# Patient Record
Sex: FEMALE | Race: WHITE | URBAN - METROPOLITAN AREA
[De-identification: names, ages, dates, MRNs, and addresses within clinical notes are randomized per-mention and may not be internally consistent; named-entity substitution may affect disease eponyms.]

---

## 2017-01-01 ENCOUNTER — RESULTS ONLY (OUTPATIENT)
Dept: OTHER | Facility: CLINIC | Age: 62
End: 2017-01-01

## 2017-01-01 ENCOUNTER — OFFICE VISIT (OUTPATIENT)
Dept: FAMILY MEDICINE | Facility: CLINIC | Age: 62
End: 2017-01-01
Payer: COMMERCIAL

## 2017-01-01 ENCOUNTER — ALLIED HEALTH/NURSE VISIT (OUTPATIENT)
Dept: TRANSPLANT | Facility: CLINIC | Age: 62
End: 2017-01-01
Attending: INTERNAL MEDICINE
Payer: COMMERCIAL

## 2017-01-01 ENCOUNTER — MEDICAL CORRESPONDENCE (OUTPATIENT)
Dept: TRANSPLANT | Facility: CLINIC | Age: 62
End: 2017-01-01

## 2017-01-01 VITALS
WEIGHT: 167.9 LBS | DIASTOLIC BLOOD PRESSURE: 59 MMHG | BODY MASS INDEX: 26.98 KG/M2 | SYSTOLIC BLOOD PRESSURE: 94 MMHG | HEART RATE: 89 BPM | HEIGHT: 66 IN | RESPIRATION RATE: 16 BRPM | OXYGEN SATURATION: 97 %

## 2017-01-01 VITALS
DIASTOLIC BLOOD PRESSURE: 64 MMHG | OXYGEN SATURATION: 100 % | TEMPERATURE: 97.5 F | BODY MASS INDEX: 26.19 KG/M2 | HEART RATE: 92 BPM | SYSTOLIC BLOOD PRESSURE: 104 MMHG | WEIGHT: 161 LBS

## 2017-01-01 DIAGNOSIS — Z71.9 ENCOUNTER FOR COUNSELING: Primary | ICD-10-CM

## 2017-01-01 DIAGNOSIS — Z79.4 TYPE 2 DIABETES MELLITUS WITH COMPLICATION, WITH LONG-TERM CURRENT USE OF INSULIN (H): Primary | ICD-10-CM

## 2017-01-01 DIAGNOSIS — I25.10 CORONARY ARTERY DISEASE INVOLVING NATIVE HEART WITHOUT ANGINA PECTORIS, UNSPECIFIED VESSEL OR LESION TYPE: ICD-10-CM

## 2017-01-01 DIAGNOSIS — E11.8 TYPE 2 DIABETES MELLITUS WITH COMPLICATION, WITH LONG-TERM CURRENT USE OF INSULIN (H): Primary | ICD-10-CM

## 2017-01-01 DIAGNOSIS — C92.00 ACUTE MYELOID LEUKEMIA NOT HAVING ACHIEVED REMISSION (H): ICD-10-CM

## 2017-01-01 DIAGNOSIS — C92.00 AML (ACUTE MYELOID LEUKEMIA) (H): Primary | ICD-10-CM

## 2017-01-01 DIAGNOSIS — G47.00 INSOMNIA, UNSPECIFIED TYPE: ICD-10-CM

## 2017-01-01 DIAGNOSIS — F41.1 GAD (GENERALIZED ANXIETY DISORDER): ICD-10-CM

## 2017-01-01 DIAGNOSIS — I77.9 BILATERAL CAROTID ARTERY DISEASE (H): ICD-10-CM

## 2017-01-01 DIAGNOSIS — E78.2 MIXED HYPERLIPIDEMIA: ICD-10-CM

## 2017-01-01 DIAGNOSIS — C92.00 ACUTE MYELOID LEUKEMIA NOT HAVING ACHIEVED REMISSION (H): Primary | ICD-10-CM

## 2017-01-01 LAB
ASBT COMMENTS: NORMAL
ASBTTEST METHOD: NORMAL
COPATH REPORT: NORMAL
DRSBT COMMENTS: NORMAL
DRSBTTEST METHOD: NORMAL
HBA1C MFR BLD: 9.3 % (ref 4.3–6)
HLA ABDR/DQ HIGH RESOLUTION BMT RECIPIENT: NORMAL
PRA BMT: NORMAL
SA1 CELL: NORMAL
SA1 COMMENTS: NORMAL
SA1 HI RISK ABY: NORMAL
SA1 MOD RISK ABY: NORMAL
SA1 TEST METHOD: NORMAL
SA2 CELL: NORMAL
SA2 COMMENTS: NORMAL
SA2 HI RISK ABY UA: NORMAL
SA2 MOD RISK ABY: NORMAL
SA2 TEST METHOD: NORMAL
SBTA* LOCUS NMDP: NORMAL
SBTA* LOCUS: NORMAL
SBTA*: NORMAL
SBTB* LOCUS: NORMAL
SBTB*: NORMAL
SBTC* LOCUS: NORMAL
SBTC*: NORMAL
SBTDQB1*: NORMAL
SBTDQB1*LOCUS: NORMAL
SBTDRB1* LOCUS - FCIS: NORMAL
SBTDRB1* NMDP: NORMAL
SBTDRB1*: NORMAL
SBTDRB4*LOCUS NMDP: NORMAL
SBTDRB4*LOCUS: NORMAL
SCR 1 TEST METHOD: NORMAL
SCR1 CELL: NORMAL
SCR1 COMMENTS: NORMAL
SCR1 RESULT: NORMAL
SCR2 CELL: NORMAL
SCR2 COMMENTS: NORMAL
SCR2 RESULT: NORMAL
SCR2 TEST METHOD: NORMAL

## 2017-01-01 PROCEDURE — 81378 HLA I & II TYPING HR: CPT | Performed by: INTERNAL MEDICINE

## 2017-01-01 PROCEDURE — 99203 OFFICE O/P NEW LOW 30 MIN: CPT | Performed by: PHYSICIAN ASSISTANT

## 2017-01-01 PROCEDURE — 81376 HLA II TYPING 1 LOCUS LR: CPT | Mod: 59 | Performed by: INTERNAL MEDICINE

## 2017-01-01 PROCEDURE — 86828 HLA CLASS I&II ANTIBODY QUAL: CPT | Performed by: INTERNAL MEDICINE

## 2017-01-01 PROCEDURE — 86833 HLA CLASS II HIGH DEFIN QUAL: CPT | Performed by: INTERNAL MEDICINE

## 2017-01-01 PROCEDURE — 99212 OFFICE O/P EST SF 10 MIN: CPT

## 2017-01-01 PROCEDURE — 81370 HLA I & II TYPING LR: CPT | Performed by: INTERNAL MEDICINE

## 2017-01-01 PROCEDURE — 81382 HLA II TYPING 1 LOC HR: CPT | Performed by: INTERNAL MEDICINE

## 2017-01-01 PROCEDURE — 86832 HLA CLASS I HIGH DEFIN QUAL: CPT | Performed by: INTERNAL MEDICINE

## 2017-01-01 PROCEDURE — 40000268 ZZH STATISTIC NO CHARGES: Mod: ZF

## 2017-01-01 PROCEDURE — 00000345 ZZHCL STATISTIC REV BONE MARROW OUTSIDE SLIDES TC 88321: Performed by: INTERNAL MEDICINE

## 2017-01-01 PROCEDURE — 36415 COLL VENOUS BLD VENIPUNCTURE: CPT | Performed by: PHYSICIAN ASSISTANT

## 2017-01-01 PROCEDURE — 83036 HEMOGLOBIN GLYCOSYLATED A1C: CPT | Performed by: PHYSICIAN ASSISTANT

## 2017-01-01 RX ORDER — GLIMEPIRIDE 4 MG/1
4 TABLET ORAL
COMMUNITY
Start: 2010-04-26 | End: 2017-01-01

## 2017-01-01 RX ORDER — ACYCLOVIR 200 MG/1
2 CAPSULE ORAL
COMMUNITY

## 2017-01-01 RX ORDER — METFORMIN HCL 500 MG
500 TABLET, EXTENDED RELEASE 24 HR ORAL
COMMUNITY
Start: 2017-03-23 | End: 2017-01-01

## 2017-01-01 RX ORDER — GLIMEPIRIDE 4 MG/1
4 TABLET ORAL
Qty: 90 TABLET | Refills: 0 | Status: SHIPPED | OUTPATIENT
Start: 2017-01-01 | End: 2018-01-01

## 2017-01-01 RX ORDER — ESZOPICLONE 3 MG/1
3 TABLET, FILM COATED ORAL
COMMUNITY
Start: 2017-06-19

## 2017-01-01 RX ORDER — METOPROLOL SUCCINATE 25 MG/1
12.5 TABLET, EXTENDED RELEASE ORAL
COMMUNITY
Start: 2017-01-01

## 2017-01-01 RX ORDER — INSULIN GLARGINE 300 U/ML
INJECTION, SOLUTION SUBCUTANEOUS
Qty: 6 ML | Refills: 1 | Status: SHIPPED | OUTPATIENT
Start: 2017-01-01 | End: 2018-01-01

## 2017-01-01 RX ORDER — LOSARTAN POTASSIUM 25 MG/1
12.5 TABLET ORAL
COMMUNITY
Start: 2017-01-01 | End: 2018-01-01

## 2017-01-01 RX ORDER — METFORMIN HCL 500 MG
500 TABLET, EXTENDED RELEASE 24 HR ORAL
Qty: 90 TABLET | Refills: 0 | Status: SHIPPED | OUTPATIENT
Start: 2017-01-01 | End: 2018-01-01

## 2017-01-01 RX ORDER — SIMVASTATIN 20 MG
20 TABLET ORAL AT BEDTIME
Qty: 90 TABLET | Refills: 0 | Status: SHIPPED | OUTPATIENT
Start: 2017-01-01 | End: 2018-01-01

## 2017-01-01 RX ORDER — CODEINE PHOSPHATE AND GUAIFENESIN 10; 100 MG/5ML; MG/5ML
SOLUTION ORAL
COMMUNITY
Start: 2017-01-01 | End: 2017-01-01

## 2017-01-01 RX ORDER — SIMVASTATIN 20 MG
TABLET ORAL AT BEDTIME
COMMUNITY
End: 2017-01-01

## 2017-01-01 RX ORDER — INSULIN GLARGINE 300 U/ML
INJECTION, SOLUTION SUBCUTANEOUS
Refills: 1 | COMMUNITY
Start: 2017-01-01 | End: 2017-01-01

## 2017-01-01 RX ORDER — LORAZEPAM 1 MG/1
1 TABLET ORAL
COMMUNITY
Start: 2017-01-01 | End: 2018-01-01

## 2017-01-01 RX ORDER — SENNOSIDES A AND B 8.6 MG/1
1-3 TABLET, FILM COATED ORAL
COMMUNITY
End: 2017-01-01

## 2017-01-01 RX ORDER — ACETAMINOPHEN 500 MG
500-1000 TABLET ORAL
COMMUNITY
End: 2017-01-01

## 2017-01-01 ASSESSMENT — PAIN SCALES - GENERAL: PAINLEVEL: NO PAIN (0)

## 2017-09-27 PROBLEM — C92.00 AML (ACUTE MYELOID LEUKEMIA) (H): Status: ACTIVE | Noted: 2017-01-01

## 2017-09-27 NOTE — MR AVS SNAPSHOT
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  After hours call the   hospital at [phone] or   [phone]. Ask for the BMT   fellow on-call            Follow-ups after your visit        Who to contact     If you have questions or need follow up information about today's clinic visit or your schedule please contact [facility] B[facility]OD AND MARROW TRANSPLANT directly at [phone].  Normal or non-critical lab and imaging results will be communicated to you by EyeQuanthart, letter or phone within 4 business days after the clinic has received the results. If you do not hear from us within 7 days, please contact the clinic through [employer] or phone. If you have a critical or abnormal lab result, we will notify you by phone as soon as possible.  Submit refill requests through Verical or call your pharmacy and they will forward the refill request to us. Please allow 3 business days for your refill to be completed.          Additional Information About Your Visit        EyeQuanthart Information     Verical lets you send messages to your doctor, view your test results, renew your prescriptions, schedule appointments and more. To sign up, go to [email or web address] . Click on \"Log in\" on the left side of the screen, which will take you to the Welcome page. Then click on \"Sign up Now\" on the right side of the page. " "    You will be asked to enter the access code listed below, as well as some personal information. Please follow the directions to create your username and password.     Your access code is: [email or web address]  Expires: 2017 10:17 AM     Your access code will  in 90 days. If you need help or a new code, please call your [facility] clinic or [phone].        Care EveryWhere ID     This is your Care EveryWhere ID. This could be used by other organizations to access your [facility] medical records  [id number]        Your Vitals Were     Pulse Respirations Height Pulse Oximetry BMI (Body Mass Index)       89 16 1.67 m (5' 5.75\") 97% 27.31 kg/m2        Blood Pressure from Last 3 Encounters:   17 94/59    Weight from Last 3 Encounters:   17 76.2 kg (167 lb 14.4 oz)              We Performed the Following     HLA ABDR/DQ High Res BMT Recipient     PRA BMT        Recent Review Flowsheet Data     There is no flowsheet data to display.               Primary Care Provider    None Specified       No primary provider on file.        Equal Access to Services     [facility]: Hadii tabitha Mason, waolinda diego, qamilind goodson, bob darby . So [facility] [phone].    ATENCIÓN: Si habla español, tiene a duncan disposición servicios gratuitos de asistencia lingüística. Llame al [phone].    We comply with applicable federal civil rights laws and [place] laws. We do not discriminate on the basis of race, color, national origin, age, disability, sex, sexual orientation, or gender identity.            Thank you!     Thank you for choosing [facility] BLOOD AND MARROW TRANSPLANT  for your care. Our goal is always to provide you with excellent care. Hearing back from our patients is one way we can continue to improve our services. Please take a few minutes to complete the written survey that you may receive in the mail after your visit with us. Thank you!           "   Your Updated Medication List - Protect others around you: Learn how to safely use, store and throw away your medicines at www.disposemymeds.org.          This list is accurate as of: 9/27/17 11:59 PM.  Always use your most recent med list.                   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

## 2017-09-27 NOTE — PROGRESS NOTES
September 27, 2017       Kal Golden MD    [address] [place] [address]      RE:  Darleen Mccraytz      Dear Dr. Golden:       Today I saw and examined in consultation Ms. Darleen Ayala, a 62-year-old female who you referred to discuss transplantation as part of the treatment options for her acute myelogenous leukemia.  You are well aware of the patient's medical history, but I will summarize it for our medical records.      The patient presented in mid July with progressively more fatigue and lack of energy.  She went to the doctor, who did not find anything specific, but then she returned to the Emergency Department and was referred to Hematology.  Further evaluation demonstrated that she had an elevated white cell count at 15,000.  I do not have the original CBC to determine if there were circulating blasts.  She was also thrombocytopenic and anemic.  She underwent a bone marrow biopsy on 07/26/2017 that demonstrated hypercellular marrow with 70% blasts.  The blasts had t(8;21).  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  Molecular markers for FLT3 were negative, NPMN1 negative, and CEBPA negative.  Phenotype of the disease showed it to be CD34 positive; myeloperoxidase positive; CD28, CD38, , CD13, HLA-DR, , , CD45, CD33, CD56, CD19, CD15 and CD79b positive.  The patient was admitted for induction chemotherapy and received 7+3.  Her day 14 marrow was hypocellular with 10-20% overall cellularity and 1% blasts.  FISH analysis showed that 64% of the cells were still positive for the t(8;21).  The patient had no further chemotherapy, as she was dealing with complications of the induction regimen.  The next bone marrow biopsy obtained upon count recovery showed a 30% cellular marrow which is slightly hypercellular for age with trilineage hematopoiesis but slightly decreased.  It was estimated to have 2% blasts and FISH showed only 3% of the  cells being positive for t(8;21).  Molecular testing for the    c-KIT mutation was still pending at the time of this bone marrow from 08/21/2017.  As part of her induction chemotherapy, the patient had fluid overload and was found to have new onset cardiomyopathy.  She was on oxygen support and an echo demonstrated diffuse global hypokinesis with an ejection fraction of 50-55%.  She had fevers starting on August 5 that persisted for several days.  She was managed with broad-spectrum antibiotics including tobramycin and vancomycin.  CT of the chest showed diffuse, well-defined, reticular and ground-glass infiltrates involving all lobes.  She had some reticular cystic fibrosis that could represent some scarring that had been present since 2012.  She had some patchy cavitary areas that could represent sequelae from septic emboli.  The patient was started on posaconazole and fevers eventually subsided.       Cardiology was consulted for the cardiomyopathy with an elevated troponin.  She was started on an ACE inhibitor and then on aspirin when platelet counts recovered.  She was also put on a beta blocker.       In addition, the patient developed dysuria and vaginitis with erythematous desquamative lesions on August 17 that were eventually found to be consistent with herpes simplex and she was treated with acyclovir.  On August 18 her counts recovered with a platelet count over 100,000 and the patient was eventually discharged.  On September 12 she received her first cycle of consolidation high-dose cytarabine given on days 1, 3 and 5.  She tolerated it well and she is currently being monitored outpatient.       The review of systems shows that the patient has had type 2 diabetes for the last 20 years.  She had been on metformin and she is now also on insulin.  She has coronary artery disease with a stent placed in 2007.  At that time, she was told she was having a myocardial infarction.  There is the cardiomyopathy that  was recently diagnosed during her hospitalization.  She was also told that she had hepatitis about a year ago.  Additional significant findings are described above in the history of present illness.  Otherwise, the remaining complete review of systems is negative.       Her past medical history is significant for:    1.  Seizures x1 five years ago.   2.  Coronary artery disease with a stent placed in .  The patient presented with a clinical picture consistent with evolving MI.   3.  The patient had a finger surgery for an injury.   4.  Cataract surgery in both eyes in 2017.   5.  Diabetes for 20 years, on insulin and metformin.     6.  Genital HSV.    7.  Stress-related eating disorder.   8.  Hepatitis.       The patient smoked since the age of 14 and is still trying to quit.  She does not abuse alcohol but used to drink until she was told about the hepatitis.  She denies the use or abuse of drugs.       She is known to be allergic to Dilaudid which caused cough, itching and trouble breathing.  Oxycodone also caused some itching.  Levaquin caused anaphylaxis according to the patient and her records.  She developed significant local swelling and erythema with a tetanus shot in the past and is felt to be allergic to that as well.      Her family history is significant for a half sister who is still alive at age 80 and has scleroderma.  She has one full brother who  at age 17 from an accident.  She has a living, healthy, 40-year-old daughter.  There are no other children or living relatives.       Her social history shows that she lives in [place].  She has a boyfriend.  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      On physical exam today, Ms. Ayala was alert, oriented, and in no acute distress.  She is a very pleasant and talkative 62-year-old.  Her apparent age is slightly older than her stated age.  She has complete alopecia.  She has a Groshong catheter in  the right upper chest that is clean with no evidence of infection.  Her buccal mucosa is intact with no drainage from the nasopharynx.  She is missing several teeth and her other teeth are not in great condition, but there are no broken teeth or signs of gum disease.  Her lungs have crackles in both bases that clear partially when she coughs.  There was no wheezing or rhonchi.  Breathing sounds are slightly decreased symmetrically in the bases.  The heart had regular rhythm and rate with no gallop, rub or murmur.  Neck veins are not elevated.  Her abdomen was flat and soft with no organomegaly or masses.  Her liver seemed to be about 10 cm on percussion.  Extremities were warm and well-perfused with trace edema.  The patient did not have any rashes, bruises or petechiae that I found.  The patient was grossly neurologically intact.       Assessment and Recommendation:  Ms. 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  She is here to discuss transplantation as part of the treatment options for her disease.  She is accompanied by her daughter.      I first reviewed with the patient the risk stratification of acute leukemia that we used to determine whether the patient is a candidate for transplant in first remission.  While the patient has the 6(8;21), she also has a c-KIT mutation that increases the risk of relapse of core binding factor AML.  In addition, the patient has multiple serious comorbidities that increase her risk of transplant-related mortality.  I explained to the patient that transplant is one of the treatment alternatives for consolidation in her case, but we have to be very thoughtful because of the obvious increased risk of transplant-related complications.       We then talked about how we get a patient to transplant starting with the determination of donor availability.  The patient has a biological daughter who will be  a half match to her and I am certain that we will be able to find at least unrelated cord blood available for her in the registry.  I discussed the pros and cons of the different donors and the fact that there is no matched sibling available for this patient.  I also mentioned to her the randomized trial comparing umbilical cord blood to haploidentical transplants for which she would be a good candidate.       We then talked about the process of pre-transplant evaluation where we determine disease stage as well as organ function.  I told the patient that her current comorbidities increase her risk of treatment-related mortality.  Further objective determination of those results will be necessary to better estimate that risk.        DICTATION ENDED HERE       CONTINUATION OF DICTATION       I then explained to the patient the rationale behind a donor transplant.  I explained to her that the idea is to replace not only her bone marrow, but her immune system, and have the immune system of the donor control the disease.  I then described to her the reaction of the donor versus the normal tissues of the recipient, which is called renqu-jirgwf-liia disease (GVHD).  I explained that over the long term we want the immune system of the patient to control the leukemia and keep it from coming back.       We then talked about the pre-transplant evaluation in which we check her heart, lungs, liver, kidneys and disease function.  With those results in place, we can determine whether the patient meets minimal eligibility criteria in terms of organ function.  I did emphasize to the patient that those who meet those criteria in several different areas by just a little bit are still at a very increased risk of treatment-related complications and mortality.        We then talked about the most common and serious complications of transplant including, but not limited to, infections, zzgwe-pmtgiq-vasv disease, disease relapse and organ  damage.  We talked about the most common and frequently used supportive measures during transplant which include chemotherapy, immunosuppression, blood products, IV nutrition and growth factors.       Overall, I quoted to the patient about a third of a chance of long-term disease-free survival with a third of a chance of treatment-related mortality and a third possibility of disease relapse and death.  Because of her comorbidity score that is at least a score of 3, maybe 4, this patient has a 40% risk of mortality in the 2 years post-transplant.  Most of that mortality occurs in the first 6 months, though.       The patient repeated back tome the zaman points of this visit including the expected long-term outcomes, the key elements of her pre-transplant evaluation, and the fact that she may be a candidate for the haplo versus cord study.  I explained to her that there is the alternative that she could continue with consolidation chemotherapy which would have less up-front risk but could result in relapsed disease in the long term.  Those that relapse will need additional salvage therapy to then be considered for transplant.  In that case, however, it is unequivocal that transplant has better outcomes than chemotherapy alone.  However, this patient's comorbidities are unlikely to get any better.  If anything, they are likely to get worse, and she may be even a worse candidate for transplant if she achieves a second remission.       The patient showed good understanding of these discussions.      At this point, my recommendation is:     1.  We should monitor her recovery from her first cycle of consolidation and plan for a second cycle.    2.  We will obtain HLA typing from the patient and her daughter and obtain consents and start a search for alternative donors in the registries as well as her HLA antibodies.    3.  It would be very helpful if we could get a hepatitis serology with HBsAg, anti-HBc, anti-HBs and  hepatitic C to determine if this patient has active chronic hepatitis or not based on her history.    4.  I fully support the patient going back to Cardiology and optimizing her heart function as much as possible.    5.  I encouraged the patient to stop smoking, and she says she will continue to work on that.    6.  It may be worth having the patient see a pulmonologist to better evaluate those lung nodules, especially the cavitary ones, as soon as possible.  A biopsy may be required.     7.  I would keep the patient on a broad-spectrum antifungal such posaconazole or voriconazole for the time being with the caution of stopping it during the administration of chemotherapy.       Thank you very much for referring Ms. Ayala and allowing us to participate in her care.  I will further discuss her case with my colleagues here at [facility] in our next patient care conference and provide you with more definitive recommendations in the next few days.      cc:  Anya Soler MD     [facility] [place] [address]       Addendum: case discussed in BMT patient care conference. There was concern with co-morbidities and risk/benefit ration. 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 It was furhter recommended to obtain BMBX upon recovery of recent HIAC consolidation and PFTs with corrected DLCO. We will then rediscuss case and determine if transplant should ever be considered. I reviewed the above with Dr Golden today 10/03/17.

## 2017-09-27 NOTE — NURSING NOTE
"Oncology Rooming Note    September 27, 2017 9:25 AM   Darleen Ayala is a 62 year old female who presents for:    No chief complaint on file.    Initial Vitals: BP 94/59 (BP Location: Right arm, Patient Position: Right side, Cuff Size: Adult Regular)  Pulse 89  Resp 16  Ht 1.67 m (5' 5.75\")  Wt 76.2 kg (167 lb 14.4 oz)  SpO2 97%  BMI 27.31 kg/m2 Estimated body mass index is 27.31 kg/(m^2) as calculated from the following:    Height as of this encounter: 1.67 m (5' 5.75\").    Weight as of this encounter: 76.2 kg (167 lb 14.4 oz). Body surface area is 1.88 meters squared.  No Pain (0) Comment: Data Unavailable   No LMP recorded.  Allergies reviewed: Yes  Medications reviewed: Yes    Medications: Medication refills not needed today.  Pharmacy name entered into EPIC: Data Unavailable    Clinical concerns: none-pt is here as as new eval     6 minutes for nursing intake (face to face time)     Purvi Mejias MA                "

## 2017-09-27 NOTE — PROGRESS NOTES
Met with patient and daughter, Tracy, after new evaluation with Dr Ruff regarding plan and BMT nurse coordinator role. Provided patient with Rudy Resendiz's and Shiela Monsivais' contact information for future questions and plan. HLA/PRA drawn, URD and financial consents signed. Daughter's HLA drawn as well. Patient verbalized understanding of provided information.

## 2017-09-27 NOTE — MR AVS SNAPSHOT
"              After Visit Summary   9/27/2017    Darleen Ayala    MRN: [id number]           Patient Information     Date Of Birth          1955        Visit Information        Provider Department      9/27/2017 11:00 AM Marcia Coats MSW;  2 116 CONSULT [facility] Blood and Marrow Transplant        Today's Diagnoses     Encounter for counseling    -  1          [facility] ([facility]  [address] [place] [id number]  Phone: [phone]  Clinic Hours:   Monday-Thursday:7am to 7pm   Friday: 7am to 5pm   Weekends and holidays:    8am to noon (in general)  If your fever is 100.5  or greater,   call the clinic.  After hours call the   hospital at [phone] or   [phone]. Ask for the BMT   fellow on-call            Follow-ups after your visit        Who to contact     If you have questions or need follow up information about today's clinic visit or your schedule please contact [facility] BLOOD AND MARROW TRANSPLANT directly at [phone].  Normal or non-critical lab and imaging results will be communicated to you by [employer], letter or phone within 4 business days after the clinic has received the results. If you do not hear from us within 7 days, please contact the clinic through JUN[employer] or phone. If you have a critical or abnormal lab result, we will notify you by phone as soon as possible.  Submit refill requests through "[employer] or call your pharmacy and they will forward the refill request to us. Please allow 3 business days for your refill to be completed.          Additional Information About Your Visit        [employer] Information     "[employer] lets you send messages to your doctor, view your test results, renew your prescriptions, schedule appointments and more. To sign up, go to [email or web address] . Click on \"Log in\" on the left side of the screen, which will take you to the Welcome page. Then click on \"Sign up Now\" on the right side of the page.     You will be " asked to enter the access code listed below, as well as some personal information. Please follow the directions to create your username and password.     Your access code is: [email or web address]  Expires: 2017 10:17 AM     Your access code will  in 90 days. If you need help or a new code, please call your [place] clinic or [phone].        Care EveryWhere ID     This is your Care EveryWhere ID. This could be used by other organizations to access your [place] medical records  [id number]         Blood Pressure from Last 3 Encounters:   17 94/59    Weight from Last 3 Encounters:   17 76.2 kg (167 lb 14.4 oz)              Today, you had the following     No orders found for display       Recent Review Flowsheet Data     There is no flowsheet data to display.               Primary Care Provider    None Specified       No primary provider on file.        Equal Access to Services     [facility]: Hadgudelia Mason, harrison diego, melisa goodson, bob darby . So [facility] [phone].    ATENCIÓN: Si habla español, tiene a duncan disposición servicios gratuitos de asistencia lingüística. Llame al [phone].    We comply with applicable federal civil rights laws and [place] laws. We do not discriminate on the basis of race, color, national origin, age, disability, sex, sexual orientation, or gender identity.            Thank you!     Thank you for choosing [facility] BLOOD AND MARROW TRANSPLANT  for your care. Our goal is always to provide you with excellent care. Hearing back from our patients is one way we can continue to improve our services. Please take a few minutes to complete the written survey that you may receive in the mail after your visit with us. Thank you!             Your Updated Medication List - Protect others around you: Learn how to safely use, store and throw away your medicines at www.disposemymeds.org.          This list is  accurate as of: 9/27/17 11:59 PM.  Always use your most recent med list.                   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

## 2017-09-27 NOTE — MR AVS SNAPSHOT
"              After Visit Summary   9/27/2017    Darleen Ayala    MRN: [id number]           Patient Information     Date Of Birth          1955        Visit Information        Provider Department      9/27/2017 10:30 AM Coordinator,  Bmt Nurse;  2 116 CONSULT [facility] Blood and Marrow Transplant        Today's Diagnoses     AML (acute myeloid leukemia) (H)    -  1          [facility] [place] [facility] [place]  [address] [place] [address]  Phone: [phone]  Clinic Hours:   Monday-Thursday:7am to 7pm   Friday: 7am to 5pm   Weekends and holidays:    8am to noon (in general)  If your fever is 100.5  or greater,   call the clinic.  After hours call the   hospital at [phone] or   [phone]. Ask for the BMT   fellow on-call            Follow-ups after your visit        Who to contact     If you have questions or need follow up information about today's clinic visit or your schedule please contact [facility] BLOOD [facility] MARROW TRANSPL[facility] directly at [phone].  Normal or non-critical lab and imaging results will be communicated to you by [employer], letter or phone within 4 business days after the clinic has received the results. If you do not hear from us within 7 days, please contact the clinic through [employer]Landingt or phone. If you have a critical or abnormal lab result, we will notify you by phone as soon as possible.  Submit refill requests through [employer] or call your pharmacy and they will forward the refill request to us. Please allow 3 business days for your refill to be completed.          Additional Information About Your Visit        [employer] Information     [employer] lets you send messages to your doctor, view your test results, renew your prescriptions, schedule appointments and more. To sign up, go to [email or web address] . Click on \"Log in\" on the left side of the screen, which will take you to the Welcome page. Then click on \"Sign up Now\" on the right side of the page. "     You will be asked to enter the access code listed below, as well as some personal information. Please follow the directions to create your username and password.     Your access code is: [email or web address]  Expires: 2017 10:17 AM     Your access code will  in 90 days. If you need help or a new code, please call your [place] clinic or [phone].        Care EveryWhere ID     This is your Care EveryWhere ID. This could be used by other organizations to access your [place] medical records  [id number]         Blood Pressure from Last 3 Encounters:   17 94/59    Weight from Last 3 Encounters:   17 76.2 kg (167 lb 14.4 oz)              Today, you had the following     No orders found for display       Recent Review Flowsheet Data     There is no flowsheet data to display.               Primary Care Provider    None Specified       No primary provider on file.        Equal Access to Services     [facility]: Hadii tabitha Mason, waolinda diego, melisa steelealmada hamzah, bob darby . So [facility] [phone].    ATENCIÓN: Si habla español, tiene a duncan disposición servicios gratuitos de asistencia lingüística. Llame al [phone].    We comply with applicable federal civil rights laws and [place] laws. We do not discriminate on the basis of race, color, national origin, age, disability sex, sexual orientation or gender identity.            Thank you!     Thank you for choosing [facility] BLOOD AND MARROW TRANSPLANT  for your care. Our goal is always to provide you with excellent care. Hearing back from our patients is one way we can continue to improve our services. Please take a few minutes to complete the written survey that you may receive in the mail after your visit with us. Thank you!             Your Updated Medication List - Protect others around you: Learn how to safely use, store and throw away your medicines at www.disposemymeds.org.           This list is accurate as of: 9/27/17 11:03 AM.  Always use your most recent med list.                   Brand Name Dispense Instructions for use Diagnosis    acetaminophen 500 MG tablet    TYLENOL     Take 500-1,000 mg by mouth    AML (acute myeloid leukemia) (H)       eszopiclone 3 MG tablet    LUNESTA     Take 3 mg by mouth    AML (acute myeloid leukemia) (H)       glimepiride 4 MG tablet    AMARYL     Take 4 mg by mouth    AML (acute myeloid leukemia) (H)       losartan 25 MG tablet    COZAAR     Take 12.5 mg by mouth    AML (acute myeloid leukemia) (H)       metFORMIN 500 MG 24 hr tablet    GLUCOPHAGE-XR     Take 500 mg by mouth    AML (acute myeloid leukemia) (H)       metoprolol 25 MG 24 hr tablet    TOPROL-XL     Take 12.5 mg by mouth    AML (acute myeloid leukemia) (H)       senna 8.6 MG tablet    SENOKOT     Take 1-3 tablets by mouth    AML (acute myeloid leukemia) (H)       TOUJEO SOLOSTAR 300 UNIT/ML injection   Generic drug:  insulin glargine U-300      INJECT 18 UNITS UNDER THE SKIN HS.    AML (acute myeloid leukemia) (H)       VIRTUSSIN A/C 100-10 MG/5ML Soln solution   Generic drug:  guaiFENesin-codeine      TAKE 5-10 ML BY MOUTH EVERY 4 HOURS AS NEEDED    AML (acute myeloid leukemia) (H)

## 2017-09-27 NOTE — LETTER
9/27/2017      RE: Darleen Ayala  [address] [facility]  [place] [address]        September 27, 2017       Kal Golden MD    [address] [place] [id number]      RE:  Darleen Ayala      Dear Dr. Golden:       Today I saw and examined in consultation Ms. Darleen Ayala, a 62-year-old female who you referred to discuss transplantation as part of the treatment options for her acute myelogenous leukemia.  You are well aware of the patient's medical history, but I will summarize it for our medical records.      The patient presented in mid July with progressively more fatigue and lack of energy.  She went to the doctor, who did not find anything specific, but then she returned to the Emergency Department and was referred to Hematology.  Further evaluation demonstrated that she had an elevated white cell count at 15,000.  I do not have the original CBC to determine if there were circulating blasts.  She was also thrombocytopenic and anemic.  She underwent a bone marrow biopsy on 07/26/2017 that demonstrated hypercellular marrow with 70% blasts.  The blasts had t(8;21).  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  Molecular markers for FLT3 were negative, NPMN1 negative, and CEBPA negative.  Phenotype of the disease showed it to be CD34 positive; myeloperoxidase positive; CD28, CD38, , CD13, HLA-DR, , , CD45, CD33, CD56, CD19, CD15 and CD79b positive.  The patient was admitted for induction chemotherapy and received 7+3.  Her day 14 marrow was hypocellular with 10-20% overall cellularity and 1% blasts.  FISH analysis showed that 64% of the cells were still positive for the t(8;21).  The patient had no further chemotherapy, as she was dealing with complications of the induction regimen.  The next bone marrow biopsy obtained upon count recovery showed a 30% cellular marrow which is slightly hypercellular for age with trilineage hematopoiesis  but slightly decreased.  It was estimated to have 2% blasts and FISH showed only 3% of the cells being positive for t(8;21).  Molecular testing for the    c-KIT mutation was still pending at the time of this bone marrow from 08/21/2017.  As part of her induction chemotherapy, the patient had fluid overload and was found to have new onset cardiomyopathy.  She was on oxygen support and an echo demonstrated diffuse global hypokinesis with an ejection fraction of 50-55%.  She had fevers starting on August 5 that persisted for several days.  She was managed with broad-spectrum antibiotics including tobramycin and vancomycin.  CT of the chest showed diffuse, well-defined, reticular and ground-glass infiltrates involving all lobes.  She had some reticular cystic fibrosis that could represent some scarring that had been present since 2012.  She had some patchy cavitary areas that could represent sequelae from septic emboli.  The patient was started on posaconazole and fevers eventually subsided.       Cardiology was consulted for the cardiomyopathy with an elevated troponin.  She was started on an ACE inhibitor and then on aspirin when platelet counts recovered.  She was also put on a beta blocker.       In addition, the patient developed dysuria and vaginitis with erythematous desquamative lesions on August 17 that were eventually found to be consistent with herpes simplex and she was treated with acyclovir.  On August 18 her counts recovered with a platelet count over 100,000 and the patient was eventually discharged.  On September 12 she received her first cycle of consolidation high-dose cytarabine given on days 1, 3 and 5.  She tolerated it well and she is currently being monitored outpatient.       The review of systems shows that the patient has had type 2 diabetes for the last 20 years.  She had been on metformin and she is now also on insulin.  She has coronary artery disease with a stent placed in 2007.  At that  time, she was told she was having a myocardial infarction.  There is the cardiomyopathy that was recently diagnosed during her hospitalization.  She was also told that she had hepatitis about a year ago.  Additional significant findings are described above in the history of present illness.  Otherwise, the remaining complete review of systems is negative.       Her past medical history is significant for:    1.  Seizures x1 five years ago.   2.  Coronary artery disease with a stent placed in .  The patient presented with a clinical picture consistent with evolving MI.   3.  The patient had a finger surgery for an injury.   4.  Cataract surgery in both eyes in 2017.   5.  Diabetes for 20 years, on insulin and metformin.     6.  Genital HSV.    7.  Stress-related eating disorder.   8.  Hepatitis.       The patient smoked since the age of 14 and is still trying to quit.  She does not abuse alcohol but used to drink until she was told about the hepatitis.  She denies the use or abuse of drugs.       She is known to be allergic to Dilaudid which caused cough, itching and trouble breathing.  Oxycodone also caused some itching.  Levaquin caused anaphylaxis according to the patient and her records.  She developed significant local swelling and erythema with a tetanus shot in the past and is felt to be allergic to that as well.      Her family history is significant for a half sister who is still alive at age 80 and has scleroderma.  She has one full brother who  at age 17 from an accident.  She has a living, healthy, 40-year-old daughter.  There are no other children or living relatives.       Her social history shows that she lives in [place].  She has a boyfriend.  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      On physical exam today, Ms. Ayala was alert, oriented, and in no acute distress.  She is a very pleasant and talkative 62-year-old.  Her apparent age is  slightly older than her stated age.  She has complete alopecia.  She has a Groshong catheter in the right upper chest that is clean with no evidence of infection.  Her buccal mucosa is intact with no drainage from the nasopharynx.  She is missing several teeth and her other teeth are not in great condition, but there are no broken teeth or signs of gum disease.  Her lungs have crackles in both bases that clear partially when she coughs.  There was no wheezing or rhonchi.  Breathing sounds are slightly decreased symmetrically in the bases.  The heart had regular rhythm and rate with no gallop, rub or murmur.  Neck veins are not elevated.  Her abdomen was flat and soft with no organomegaly or masses.  Her liver seemed to be about 10 cm on percussion.  Extremities were warm and well-perfused with trace edema.  The patient did not have any rashes, bruises or petechiae that I found.  The patient was grossly neurologically intact.       Assessment and Recommendation:  Ms. 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  She is here to discuss transplantation as part of the treatment options for her disease.  She is accompanied by her daughter.      I first reviewed with the patient the risk stratification of acute leukemia that we used to determine whether the patient is a candidate for transplant in first remission.  While the patient has the 6(8;21), she also has a c-KIT mutation that increases the risk of relapse of core binding factor AML.  In addition, the patient has multiple serious comorbidities that increase her risk of transplant-related mortality.  I explained to the patient that transplant is one of the treatment alternatives for consolidation in her case, but we have to be very thoughtful because of the obvious increased risk of transplant-related complications.       We then talked about how we get a patient to transplant starting  with the determination of donor availability.  The patient has a biological daughter who will be a half match to her and I am certain that we will be able to find at least unrelated cord blood available for her in the registry.  I discussed the pros and cons of the different donors and the fact that there is no matched sibling available for this patient.  I also mentioned to her the randomized trial comparing umbilical cord blood to haploidentical transplants for which she would be a good candidate.       We then talked about the process of pre-transplant evaluation where we determine disease stage as well as organ function.  I told the patient that her current comorbidities increase her risk of treatment-related mortality.  Further objective determination of those results will be necessary to better estimate that risk.        DICTATION ENDED HERE       CONTINUATION OF DICTATION       I then explained to the patient the rationale behind a donor transplant.  I explained to her that the idea is to replace not only her bone marrow, but her immune system, and have the immune system of the donor control the disease.  I then described to her the reaction of the donor versus the normal tissues of the recipient, which is called kzese-kxskib-dviq disease (GVHD).  I explained that over the long term we want the immune system of the patient to control the leukemia and keep it from coming back.       We then talked about the pre-transplant evaluation in which we check her heart, lungs, liver, kidneys and disease function.  With those results in place, we can determine whether the patient meets minimal eligibility criteria in terms of organ function.  I did emphasize to the patient that those who meet those criteria in several different areas by just a little bit are still at a very increased risk of treatment-related complications and mortality.        We then talked about the most common and serious complications of transplant  including, but not limited to, infections, bpjcu-hrfpif-uzjs disease, disease relapse and organ damage.  We talked about the most common and frequently used supportive measures during transplant which include chemotherapy, immunosuppression, blood products, IV nutrition and growth factors.       Overall, I quoted to the patient about a third of a chance of long-term disease-free survival with a third of a chance of treatment-related mortality and a third possibility of disease relapse and death.  Because of her comorbidity score that is at least a score of 3, maybe 4, this patient has a 40% risk of mortality in the 2 years post-transplant.  Most of that mortality occurs in the first 6 months, though.       The patient repeated back tome the zaman points of this visit including the expected long-term outcomes, the key elements of her pre-transplant evaluation, and the fact that she may be a candidate for the haplo versus cord study.  I explained to her that there is the alternative that she could continue with consolidation chemotherapy which would have less up-front risk but could result in relapsed disease in the long term.  Those that relapse will need additional salvage therapy to then be considered for transplant.  In that case, however, it is unequivocal that transplant has better outcomes than chemotherapy alone.  However, this patient's comorbidities are unlikely to get any better.  If anything, they are likely to get worse, and she may be even a worse candidate for transplant if she achieves a second remission.       The patient showed good understanding of these discussions.      At this point, my recommendation is:     1.  We should monitor her recovery from her first cycle of consolidation and plan for a second cycle.    2.  We will obtain HLA typing from the patient and her daughter and obtain consents and start a search for alternative donors in the registries as well as her HLA antibodies.    3.  It  would be very helpful if we could get a hepatitis serology with HBsAg, anti-HBc, anti-HBs and hepatitic C to determine if this patient has active chronic hepatitis or not based on her history.    4.  I fully support the patient going back to Cardiology and optimizing her heart function as much as possible.    5.  I encouraged the patient to stop smoking, and she says she will continue to work on that.    6.  It may be worth having the patient see a pulmonologist to better evaluate those lung nodules, especially the cavitary ones, as soon as possible.  A biopsy may be required.     7.  I would keep the patient on a broad-spectrum antifungal such posaconazole or voriconazole for the time being with the caution of stopping it during the administration of chemotherapy.       Thank you very much for referring Ms. Ayala and allowing us to participate in her care.  I will further discuss her case with my colleagues here at [facility] in our next patient care conference and provide you with more definitive recommendations in the next few days.      cc:  Anya Soler MD     [address] [place] [address]       Addendum: case discussed in BMT patient care conference. There was concern with co-morbidities and risk/benefit ration. 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 It was furhter recommended to obtain BMBX upon recovery of recent HIAC consolidation and PFTs with corrected DLCO. We will then rediscuss case and determine if transplant should ever be considered. I reviewed the above with Dr Golden today 10/03/17.    Malvin Ruff MD

## 2017-10-03 NOTE — PROGRESS NOTES
Blood and Marrow Transplant   New Transplant Visit with   Clinical     Assessment completed on 2017 of living situation, support system, financial status, functional status, coping, stressors, need for resources and social work intervention provided as needed. Information for this assessment was provided by pt and pt's daughter-Yandy's report in addition to medical chart review and consultation with medical team.     Present:  Patient: Darleen Ayala  Daughter: Yandy Ayala  : JOELLEN Cagle, [facility]    Medical Team   Nurse Coordinator: Ligia García RN  BMT Physician: Malvin Ruff MD  Referring Physician: Shellie Soler MD    Presenting Information:  Pt is a 62 year old female diagnosed with Acute Myeloid Leukemia (AML). Pt presents for allogeneic stem cell transplant discussion.    Contact Information:  Cell Phone: [phone]    Special Needs: No needs identified at this time. Pt lives in [place] ([place] minutes[place]22[place]6 miles) which is within the required distance of the hospital. Pt does not need to relocate.     Family Information:   Spouse:    Parents:   Siblings: Sister (80 years old-Lives in [place]; half sister)  Children: 1 Daughter-Yandy Ayala    Education/Employment:  Currently employed: No; pt is on a leave. Pt said she can go back at anytime.   Employer: [employer]   Occupation:     Finances/Insurance: No insurance concerns identified at this time.    Source of Income: Social Security     KIKE discussed joselyn options and asked pt to let SW know if they would like to apply in the future. Pt received the [employer] joselyn.    KIKE provided information regarding the insurance authorization process and the role of the BMT financial case-mangers. KIKE provided contact info for the BMT financial case-managers and referred pt to them for future insurance questions.     Caregiver:   KIKE discussed with the pt and pt's  daughter-Yandy the caregiver role and expectation at length. Pt is agreeable to having a full time caregiver for the minimum of 100 days until cleared by the BMT Physician. Pt's identified caregivers are pt's daughter at this time. Pt will talk with family/friends to discuss caregiver options. Caregiver education and information provided. No caregiver concerns identified.     Healthcare Directive: No. SW provided education and forms. SW encouraged pt to have discussions with their family regarding their health care wishes.     Resources Provided:  BMT Information Book  BMT Resources Packet  Healthcare Directive  Transplant Unit Description and Information     Identified Concerns:  No concerns identified at this time.     Summary:  Pt presents to [facility] regarding an allogeneic stem cell transplant. Pt and pt's daughter asked good/appropriate questions regarding psychosocial factors related to BMT; all questions were addressed. Pt presented as calm and pleasant. Pt's affect was appropriate. Pt does not need to relocate. No concerns regarding caregiver requirement at this time.     Plan:   SW provided contact information and encouraged pt to contact SW with any additional questions, concerns, resources and/or for support. SW will continue to follow pt to provide support and guidance with resources as needed.     JOELLEN Cagle, MAR[facility]CTORIA  Phone: [phone]  Pager: [phone]

## 2017-11-16 NOTE — MR AVS SNAPSHOT
"              After Visit Summary   2017    Darleen Ayala    MRN: [id number]           Patient Information     Date Of Birth          1955        Visit Information        Provider Department      2017 11:20 AM Kehr, Kristen M, PA-C [facility]        Today's Diagnoses     Type 2 diabetes mellitus with complication, with long-term current use of insulin (H)    -  1    Acute myeloid leukemia not having achieved remission (H)           Follow-ups after your visit        Who to contact     If you have questions or need follow up information about today's clinic visit or your schedule please contact [facility] [place] directly at [phone].  Normal or non-critical lab and imaging results will be communicated to you by MyChart, letter or phone within 4 business days after the clinic has received the results. If you do not hear from us within 7 days, please contact the clinic through MyChart or phone. If you have a critical or abnormal lab result, we will notify you by phone as soon as possible.  Submit refill requests through [email or web address] or call your pharmacy and they will forward the refill request to us. Please allow 3 business days for your refill to be completed.          Additional Information About Your Visit        MyChart Information     [email or web address] lets you send messages to your doctor, view your test results, renew your prescriptions, schedule appointments and more. To sign up, go to [email or web address] . Click on \"Log in\" on the left side of the screen, which will take you to the Welcome page. Then click on \"Sign up Now\" on the right side of the page.     You will be asked to enter the access code listed below, as well as some personal information. Please follow the directions to create your username and password.     Your access code is: [email or web address]  Expires: 2017  9:17 AM     Your access code will  in 90 days. If you need help or a new code, please call your " [facility] or [phone].        Care EveryWhere ID     This is your Care EveryWhere ID. This could be used by other organizations to access your [facility] medical records  [id number]         Blood Pressure from Last 3 Encounters:   09/27/17 94/59    Weight from Last 3 Encounters:   09/27/17 167 lb 14.4 oz (76.2 kg)              We Performed the Following     Hemoglobin A1c          Today's Medication Changes          These changes are accurate as of: 11/16/17 12:12 PM.  If you have any questions, ask your nurse or doctor.               These medicines have changed or have updated prescriptions.        Dose/Directions    glimepiride 4 MG tablet   Commonly known as:  AMARYL   This may have changed:  when to take this   Used for:  Acute myeloid leukemia not having achieved remission (H)   Changed by:  Kehr, Kristen M, PA-C        Dose:  4 mg   Take 1 tablet (4 mg) by mouth every morning (before breakfast)   Quantity:  90 tablet   Refills:  0       insulin pen needle 32G X 4 MM   This may have changed:  how to take this   Used for:  Acute myeloid leukemia not having achieved remission (H)   Changed by:  Kehr, Kristen M, PA-C        Use  pen needles daily or as directed.   Quantity:  100 each   Refills:  0       metFORMIN 500 MG 24 hr tablet   Commonly known as:  GLUCOPHAGE-XR   This may have changed:  when to take this   Used for:  Acute myeloid leukemia not having achieved remission (H)   Changed by:  Kehr, Kristen M, PA-C        Dose:  500 mg   Take 1 tablet (500 mg) by mouth daily (with dinner)   Quantity:  90 tablet   Refills:  0       simvastatin 20 MG tablet   Commonly known as:  ZOCOR   This may have changed:  how much to take   Used for:  Acute myeloid leukemia not having achieved remission (H)   Changed by:  Kehr, Kristen M, PA-C        Dose:  20 mg   Take 1 tablet (20 mg) by mouth At Bedtime   Quantity:  90 tablet   Refills:  0       TOUJEO SOLOSTAR 300 UNIT/ML injection   This may have changed:  See  the new instructions.   Used for:  Acute myeloid leukemia not having achieved remission (H)   Generic drug:  insulin glargine U-300   Changed by:  Kehr, Kristen M, PA-C        INJECT 85 UNITS UNDER THE SKIN HS.   Quantity:  6 mL   Refills:  1            Where to get your medicines      These medications were sent to [facility] [address] [facility] [address]  [address] [address]     Phone:  [phone]     glimepiride 4 MG tablet    insulin pen needle 32G X 4 MM    metFORMIN 500 MG 24 hr tablet    simvastatin 20 MG tablet    TOUJEO SOLOSTAR 300 UNIT/ML injection                Primary Care Provider Office Phone # Fax #    [facility] [phone] [address] [facility] [address]        Equal Access to Services     [facility]: Hadii aad ku hadasho Soomaali, waaxda luqadaha, qaybta kaalmada adeegyada, waxay idiin hayaan adeeg kharaenio laanabella . So [facility] [phone].    ATENCIÓN: Si habla español, tiene a duncan disposición servicios gratuitos de asistencia lingüística. Panfilo al [phone].    We comply with applicable federal civil rights laws and [place] laws. We do not discriminate on the basis of race, color, national origin, age, disability, sex, sexual orientation, or gender identity.            Thank you!     Thank you for choosing [facility]  for your care. Our goal is always to provide you with excellent care. Hearing back from our patients is one way we can continue to improve our services. Please take a few minutes to complete the written survey that you may receive in the mail after your visit with us. Thank you!             Your Updated Medication List - Protect others around you: Learn how to safely use, store and throw away your medicines at www.disposemymeds.org.          This list is accurate as of: 11/16/17 12:12 PM.  Always use your most recent med list.                    Brand Name Dispense Instructions for use Diagnosis    acyclovir 200 MG capsule    ZOVIRAX     Take 2 capsules by mouth        eszopiclone 3 MG tablet    LUNESTA     Take 3 mg by mouth    Acute myeloid leukemia not having achieved remission (H)       glimepiride 4 MG tablet    AMARYL    90 tablet    Take 1 tablet (4 mg) by mouth every morning (before breakfast)    Acute myeloid leukemia not having achieved remission (H)       insulin pen needle 32G X 4 MM     100 each    Use  pen needles daily or as directed.    Acute myeloid leukemia not having achieved remission (H)       LORazepam 1 MG tablet    ATIVAN     Take 1 mg by mouth        losartan 25 MG tablet    COZAAR     Take 12.5 mg by mouth    Acute myeloid leukemia not having achieved remission (H)       metFORMIN 500 MG 24 hr tablet    GLUCOPHAGE-XR    90 tablet    Take 1 tablet (500 mg) by mouth daily (with dinner)    Acute myeloid leukemia not having achieved remission (H)       metoprolol 25 MG 24 hr tablet    TOPROL-XL     Take 12.5 mg by mouth    Acute myeloid leukemia not having achieved remission (H)       simvastatin 20 MG tablet    ZOCOR    90 tablet    Take 1 tablet (20 mg) by mouth At Bedtime    Acute myeloid leukemia not having achieved remission (H)       TOUJEO SOLOSTAR 300 UNIT/ML injection   Generic drug:  insulin glargine U-300     6 mL    INJECT 85 UNITS UNDER THE SKIN HS.    Acute myeloid leukemia not having achieved remission (H)

## 2017-11-16 NOTE — LETTER
[facility]  [address] [address] [phone]    November 17, 2017    Darleen Ayala  [address] [facility] [address]            Dear Darleen,    The results of your recent tests were normal.  Below is a copy of the results.  It was a pleasure to see you at your last appointment.    If you have any questions or concerns, please call myself or my nurse at [phone].    Sincerely,    Kristen Kehr, PA-C/flakita    Results for orders placed or performed in visit on 11/16/17   Hemoglobin A1c   Result Value Ref Range    Hemoglobin A1C 9.3 (H) 4.3 - 6.0 %

## 2017-11-16 NOTE — PROGRESS NOTES
SUBJECTIVE:   Darleen Ayala is a 62 year old female who presents to clinic today for the following health issues:      Establish care  Darleen Ayala is a former patient at [facility]. Her primary provider recently retired.   She moved to [place] in July and looking to establish here. She has a complex medical history.   She has type 2 diabetes that has been well controlled until she was diagnosed with Acute Myeloid Leukemia in July. She has been treated with chemotherapy for months. She has been treated with prednisone during this time also. This has caused her blood sugars to be elevated. Her primary has kept her on her same regimen of medications for the diabetes throughout her treatment. She recalls her last A1C was around 9. She also admits that during her treatment, her appetite and taste has been up and down. Foods don't taste good and then she will turn to sweets. She has not been checking her sugars regularly. She denies any hypoglycemic episodes on the days when she does not eat due to chemo. She believes that most of the time her blood sugars are high.   She has a Cardiologist that has been treating her also. She developed CHF when she initially started the chemotherapy. CHF has improved. She also has a history of acute myocardial infarction in 2008. Cardiology continues to manage.   She has generalized anxiety: This is treated by Oncology with ativan. The anxiety causes insomnia and she was on Lunesta prior to starting the chemo. Medication was adjusted with the chemotherapy. She feels this is well controlled.     Today, she is needing refills of her diabetes medications. Her regimen includes:  1. Glucophage  mg with dinnter  2. Glimepiride 4 mg daily with breakfast.  3. Toujeo 3000 U /ml 85 Units at night.       Patient problem list is reviewed and copied from Cardiology consultation 10/5/2017.     Patient Active Problem List   Diagnosis Date Noted     Acute systolic CHF (congestive  heart failure) ([facility]) 08/15/2017     Lung nodules 08/12/2017     Anemia following use of chemotherapeutic drug     Neutropenic fever ([facility])     Pancytopenia due to antineoplastic chemotherapy ([facility])     Thrombocytopenia ([facility])     Thrush     Vaginal yeast infection     Tobacco dependence     Encounter for antineoplastic chemotherapy     Wound of left groin, initial encounter     Constipation 07/28/2017     Acute myeloid leukemia not having achieved remission ([facility]) 07/25/2017     Generalized abdominal pain 06/14/2016     Hyponatremia 07/05/2015   Repeated episodes possibly due to polydipsia     Idiopathic acute pancreatitis 07/05/2015   7/15     Insomnia 04/22/2015     ASHD (arteriosclerotic heart disease) ([facility]) 04/22/2015 11/29/08 Inferior MI, MATHEUS prox RCA EF 45%  3/09 negative GXT EF 59%     Bilateral carotid artery stenosis ([facility]) 01/08/2015   12/18/15 <50% R ICA stenosis, severe right external ELBA. 3/16 Reconfirmed right external carotid stenosis   3/17 Moderate (50-69%) stenosis of the proximal left internal carotid. Mild stenosis of the proximal right internal carotid artery is unchanged. Significant stenosis of the proximal right external carotid artery is unchanged.     Generalized anxiety disorder ([facility]) 11/11/2014     Seizure ([facility]) 05/15/2012   X1, secondary to stress, no meds     SIADH (syndrome of inappropriate ADH production) ([facility]) 05/15/2012   ?Dx-pt unaware of, no records of Hx     Dyslipidemia ([facility]) 05/26/2011     Type 2 diabetes mellitus without complication ([facility]) ([facility]) 02/23/2011           Problem list and histories reviewed & adjusted, as indicated.  Additional history: as documented    Patient Active Problem List   Diagnosis     AML (acute myeloid leukemia) (H)     No past surgical history on file.    Social History   Substance Use Topics     Smoking status: Current Some Day Smoker     Types: Cigarettes     Smokeless tobacco:  Never Used     Alcohol use Not on file     No family history on file.      Current Outpatient Prescriptions   Medication Sig Dispense Refill     acyclovir (ZOVIRAX) 200 MG capsule Take 2 capsules by mouth       LORazepam (ATIVAN) 1 MG tablet Take 1 mg by mouth       simvastatin (ZOCOR) 20 MG tablet Take 1 tablet (20 mg) by mouth At Bedtime 90 tablet 0     glimepiride (AMARYL) 4 MG tablet Take 1 tablet (4 mg) by mouth every morning (before breakfast) 90 tablet 0     metFORMIN (GLUCOPHAGE-XR) 500 MG 24 hr tablet Take 1 tablet (500 mg) by mouth daily (with dinner) 90 tablet 0     insulin pen needle 32G X 4 MM Use  pen needles daily or as directed. 100 each 0     TOUJEO SOLOSTAR 300 UNIT/ML injection INJECT 85 UNITS UNDER THE SKIN HS. 6 mL 1     eszopiclone (LUNESTA) 3 MG tablet Take 3 mg by mouth       losartan (COZAAR) 25 MG tablet Take 12.5 mg by mouth       metoprolol (TOPROL-XL) 25 MG 24 hr tablet Take 12.5 mg by mouth       Allergies   Allergen Reactions     Levofloxacin Anaphylaxis     Bisacodyl      Other reaction(s): Flushing     Hydromorphone      Other reaction(s): Other  Difficulty swallowing     Oxycodone Itching     Shellfish-Derived Products Hives     Tetanus Toxoids      Other reaction(s): Swelling, lips/tongue  fever     Tetanus-Diphtheria Toxoids          Reviewed and updated as needed this visit by clinical staff  Allergies  Meds       Reviewed and updated as needed this visit by Provider         ROS:  CONSTITUTIONAL:fatigue due to chemotherapy. No fever or chills.   E/M: NEGATIVE for ear, mouth and throat problems  R: NEGATIVE for significant cough or SOB  CV: NEGATIVE for chest pain, palpitations or peripheral edema  MUSCULOSKELETAL: NEGATIVE for significant arthralgias or myalgia  PSYCHIATRIC: NEGATIVE for changes in mood or affect  ROS otherwise negative    OBJECTIVE:     There were no vitals taken for this visit.  There is no height or weight on file to calculate BMI.  GENERAL: alert and no  distress, ill appearing / frail  RESP: lungs clear to auscultation - no rales, rhonchi or wheezes  CV: regular rate and rhythm, normal S1 S2, no S3 or S4, no murmur, click or rub, no peripheral edema and peripheral pulses strong  PSYCH: mentation appears normal, affect normal/bright    Diagnostic Test Results:  pending    ASSESSMENT/PLAN:   1. Type 2 diabetes mellitus with complication, with long-term current use of insulin (H)  All labs reviewed in Care Everywhere. She is having lab tests done weekly for monitoring of the chemotherapy.   She is going to need an updated A1C.   Refills of her current medications given.   She is going to need to establish with Internal Medicine based on her complex medical history.   An appointment was scheduled with Dr. Smith within the next month. 3 months of medication for now. She will be having another treatment in the hospital next week, and with the holidays, there may be difficulty with scheduling.   - Hemoglobin A1c    2. Acute myeloid leukemia not having achieved remission (H)  See above. Currently having chemotherapy and treatment per Oncology  - simvastatin (ZOCOR) 20 MG tablet; Take 1 tablet (20 mg) by mouth At Bedtime  Dispense: 90 tablet; Refill: 0  - glimepiride (AMARYL) 4 MG tablet; Take 1 tablet (4 mg) by mouth every morning (before breakfast)  Dispense: 90 tablet; Refill: 0  - metFORMIN (GLUCOPHAGE-XR) 500 MG 24 hr tablet; Take 1 tablet (500 mg) by mouth daily (with dinner)  Dispense: 90 tablet; Refill: 0  - insulin pen needle 32G X 4 MM; Use  pen needles daily or as directed.  Dispense: 100 each; Refill: 0  - TOUJEO SOLOSTAR 300 UNIT/ML injection; INJECT 85 UNITS UNDER THE SKIN HS.  Dispense: 6 mL; Refill: 1    3. Coronary artery disease involving native heart without angina pectoris, unspecified vessel or lesion type  Treated per Cardiology.   She is on simvastatin    4. Bilateral carotid artery disease (H)  Monitored by Cardiology / stable condition    5. ILSA  (generalized anxiety disorder)  6. Insomnia, unspecified type  Stable, she is on ativan per Oncology for now.     7. Mixed hyperlipidemia  See above, on simvsatin.         Kristen M. Kehr, PA-C  [facility]

## 2017-11-17 PROBLEM — I77.9 BILATERAL CAROTID ARTERY DISEASE (H): Status: ACTIVE | Noted: 2017-01-01

## 2017-11-17 PROBLEM — E78.2 MIXED HYPERLIPIDEMIA: Status: ACTIVE | Noted: 2017-01-01

## 2017-11-17 PROBLEM — I25.10 CORONARY ARTERY DISEASE INVOLVING NATIVE HEART WITHOUT ANGINA PECTORIS, UNSPECIFIED VESSEL OR LESION TYPE: Status: ACTIVE | Noted: 2017-01-01

## 2017-11-17 PROBLEM — G47.00 INSOMNIA, UNSPECIFIED TYPE: Status: ACTIVE | Noted: 2017-01-01

## 2017-11-17 PROBLEM — F41.1 GAD (GENERALIZED ANXIETY DISORDER): Status: ACTIVE | Noted: 2017-01-01

## 2017-11-17 PROBLEM — Z79.4 TYPE 2 DIABETES MELLITUS WITH COMPLICATION, WITH LONG-TERM CURRENT USE OF INSULIN (H): Status: ACTIVE | Noted: 2017-01-01

## 2017-11-17 PROBLEM — E11.8 TYPE 2 DIABETES MELLITUS WITH COMPLICATION, WITH LONG-TERM CURRENT USE OF INSULIN (H): Status: ACTIVE | Noted: 2017-01-01

## 2017-11-20 NOTE — NURSING NOTE
"Chief Complaint   Patient presents with     Establish Care       Initial /64  Pulse 92  Temp 97.5  F (36.4  C) (Oral)  Wt 161 lb (73 kg)  SpO2 100%  BMI 26.19 kg/m2 Estimated body mass index is 26.19 kg/(m^2) as calculated from the following:    Height as of 9/27/17: 5' 5.75\" (1.67 m).    Weight as of this encounter: 161 lb (73 kg).  Medication Reconciliation: complete    CASIE Zeng MA    "

## 2018-01-01 ENCOUNTER — RESULTS ONLY (OUTPATIENT)
Dept: OTHER | Facility: CLINIC | Age: 63
End: 2018-01-01

## 2018-01-01 ENCOUNTER — DOCUMENTATION ONLY (OUTPATIENT)
Dept: LAB | Facility: CLINIC | Age: 63
End: 2018-01-01

## 2018-01-01 ENCOUNTER — OFFICE VISIT (OUTPATIENT)
Dept: INTERNAL MEDICINE | Facility: CLINIC | Age: 63
End: 2018-01-01
Payer: COMMERCIAL

## 2018-01-01 ENCOUNTER — HEALTH MAINTENANCE LETTER (OUTPATIENT)
Age: 63
End: 2018-01-01

## 2018-01-01 ENCOUNTER — TELEPHONE (OUTPATIENT)
Dept: FAMILY MEDICINE | Facility: CLINIC | Age: 63
End: 2018-01-01

## 2018-01-01 ENCOUNTER — THERAPY VISIT (OUTPATIENT)
Dept: PHYSICAL THERAPY | Facility: CLINIC | Age: 63
End: 2018-01-01
Payer: COMMERCIAL

## 2018-01-01 ENCOUNTER — TELEPHONE (OUTPATIENT)
Dept: INTERNAL MEDICINE | Facility: CLINIC | Age: 63
End: 2018-01-01

## 2018-01-01 ENCOUNTER — OFFICE VISIT (OUTPATIENT)
Dept: TRANSPLANT | Facility: CLINIC | Age: 63
End: 2018-01-01
Attending: INTERNAL MEDICINE
Payer: COMMERCIAL

## 2018-01-01 ENCOUNTER — APPOINTMENT (OUTPATIENT)
Dept: LAB | Facility: CLINIC | Age: 63
End: 2018-01-01
Attending: INTERNAL MEDICINE
Payer: COMMERCIAL

## 2018-01-01 ENCOUNTER — TELEPHONE (OUTPATIENT)
Dept: TRANSPLANT | Facility: CLINIC | Age: 63
End: 2018-01-01

## 2018-01-01 ENCOUNTER — MEDICAL CORRESPONDENCE (OUTPATIENT)
Dept: TRANSPLANT | Facility: CLINIC | Age: 63
End: 2018-01-01

## 2018-01-01 VITALS
BODY MASS INDEX: 26.68 KG/M2 | OXYGEN SATURATION: 100 % | HEART RATE: 42 BPM | DIASTOLIC BLOOD PRESSURE: 67 MMHG | SYSTOLIC BLOOD PRESSURE: 156 MMHG | WEIGHT: 166 LBS | RESPIRATION RATE: 16 BRPM | HEIGHT: 66 IN | TEMPERATURE: 97.3 F

## 2018-01-01 VITALS
SYSTOLIC BLOOD PRESSURE: 95 MMHG | HEIGHT: 66 IN | RESPIRATION RATE: 20 BRPM | HEART RATE: 83 BPM | WEIGHT: 165 LBS | DIASTOLIC BLOOD PRESSURE: 54 MMHG | BODY MASS INDEX: 26.52 KG/M2 | TEMPERATURE: 98.8 F | OXYGEN SATURATION: 100 %

## 2018-01-01 VITALS
OXYGEN SATURATION: 99 % | TEMPERATURE: 96.9 F | HEART RATE: 77 BPM | WEIGHT: 167 LBS | BODY MASS INDEX: 26.84 KG/M2 | SYSTOLIC BLOOD PRESSURE: 106 MMHG | HEIGHT: 66 IN | RESPIRATION RATE: 18 BRPM | DIASTOLIC BLOOD PRESSURE: 68 MMHG

## 2018-01-01 DIAGNOSIS — E87.1 HYPONATREMIA: ICD-10-CM

## 2018-01-01 DIAGNOSIS — E11.8 TYPE 2 DIABETES MELLITUS WITH COMPLICATION, WITH LONG-TERM CURRENT USE OF INSULIN (H): Primary | ICD-10-CM

## 2018-01-01 DIAGNOSIS — M62.81 GENERALIZED MUSCLE WEAKNESS: Primary | ICD-10-CM

## 2018-01-01 DIAGNOSIS — G47.00 INSOMNIA, UNSPECIFIED TYPE: Primary | ICD-10-CM

## 2018-01-01 DIAGNOSIS — C92.00 ACUTE MYELOID LEUKEMIA NOT HAVING ACHIEVED REMISSION (H): Primary | ICD-10-CM

## 2018-01-01 DIAGNOSIS — E11.8 TYPE 2 DIABETES MELLITUS WITH COMPLICATION, WITH LONG-TERM CURRENT USE OF INSULIN (H): ICD-10-CM

## 2018-01-01 DIAGNOSIS — Z71.9 VISIT FOR COUNSELING: Primary | ICD-10-CM

## 2018-01-01 DIAGNOSIS — C92.00 AML (ACUTE MYELOID LEUKEMIA) (H): Primary | ICD-10-CM

## 2018-01-01 DIAGNOSIS — I77.9 BILATERAL CAROTID ARTERY DISEASE (H): ICD-10-CM

## 2018-01-01 DIAGNOSIS — Z79.4 TYPE 2 DIABETES MELLITUS WITH COMPLICATION, WITH LONG-TERM CURRENT USE OF INSULIN (H): Primary | ICD-10-CM

## 2018-01-01 DIAGNOSIS — C92.00 ACUTE MYELOID LEUKEMIA NOT HAVING ACHIEVED REMISSION (H): ICD-10-CM

## 2018-01-01 DIAGNOSIS — Z79.4 TYPE 2 DIABETES MELLITUS WITH COMPLICATION, WITH LONG-TERM CURRENT USE OF INSULIN (H): ICD-10-CM

## 2018-01-01 LAB
ANION GAP SERPL CALCULATED.3IONS-SCNC: 8 MMOL/L (ref 3–14)
BASOPHILS # BLD AUTO: 0 10E9/L (ref 0–0.2)
BASOPHILS NFR BLD AUTO: 0 %
BUN SERPL-MCNC: 10 MG/DL (ref 7–30)
CALCIUM SERPL-MCNC: 8.8 MG/DL (ref 8.5–10.1)
CHLORIDE SERPL-SCNC: 97 MMOL/L (ref 94–109)
CHOLEST SERPL-MCNC: 176 MG/DL
CO2 SERPL-SCNC: 25 MMOL/L (ref 20–32)
COPATH REPORT: NORMAL
CREAT SERPL-MCNC: 0.71 MG/DL (ref 0.52–1.04)
DIFFERENTIAL METHOD BLD: ABNORMAL
EOSINOPHIL # BLD AUTO: 0 10E9/L (ref 0–0.7)
EOSINOPHIL NFR BLD AUTO: 0 %
ERYTHROCYTE [DISTWIDTH] IN BLOOD BY AUTOMATED COUNT: 12.6 % (ref 10–15)
GFR SERPL CREATININE-BSD FRML MDRD: 83 ML/MIN/1.7M2
GLUCOSE SERPL-MCNC: 230 MG/DL (ref 70–99)
HCT VFR BLD AUTO: 22.7 % (ref 35–47)
HDLC SERPL-MCNC: 69 MG/DL
HGB BLD-MCNC: 8.5 G/DL (ref 11.7–15.7)
LDLC SERPL CALC-MCNC: 80 MG/DL
LYMPHOCYTES # BLD AUTO: 0.2 10E9/L (ref 0.8–5.3)
LYMPHOCYTES NFR BLD AUTO: 13.9 %
MCH RBC QN AUTO: 30.8 PG (ref 26.5–33)
MCHC RBC AUTO-ENTMCNC: 37.4 G/DL (ref 31.5–36.5)
MCV RBC AUTO: 82 FL (ref 78–100)
MONOCYTES # BLD AUTO: 0.2 10E9/L (ref 0–1.3)
MONOCYTES NFR BLD AUTO: 11.3 %
MYELOCYTES # BLD: 0 10E9/L
MYELOCYTES NFR BLD MANUAL: 0.9 %
NEUTROPHILS # BLD AUTO: 1 10E9/L (ref 1.6–8.3)
NEUTROPHILS NFR BLD AUTO: 72.2 %
NONHDLC SERPL-MCNC: 107 MG/DL
OTHER CELLS # BLD MANUAL: 0 10E9/L
OTHER CELLS NFR BLD MANUAL: 1.7 %
PLATELET # BLD AUTO: 21 10E9/L (ref 150–450)
PLATELET # BLD EST: ABNORMAL 10*3/UL
POTASSIUM SERPL-SCNC: 4.5 MMOL/L (ref 3.4–5.3)
PRA BMT: NORMAL
RBC # BLD AUTO: 2.76 10E12/L (ref 3.8–5.2)
RBC MORPH BLD: NORMAL
SCR 1 TEST METHOD: NORMAL
SCR1 CELL: NORMAL
SCR1 COMMENTS: NORMAL
SCR1 RESULT: NORMAL
SCR2 CELL: NORMAL
SCR2 COMMENTS: NORMAL
SCR2 RESULT: NORMAL
SCR2 TEST METHOD: NORMAL
SODIUM SERPL-SCNC: 130 MMOL/L (ref 133–144)
TRIGL SERPL-MCNC: 134 MG/DL
TSH SERPL DL<=0.005 MIU/L-ACNC: 2.51 MU/L (ref 0.4–4)
WBC # BLD AUTO: 1.4 10E9/L (ref 4–11)

## 2018-01-01 PROCEDURE — 99214 OFFICE O/P EST MOD 30 MIN: CPT | Performed by: INTERNAL MEDICINE

## 2018-01-01 PROCEDURE — 84443 ASSAY THYROID STIM HORMONE: CPT | Performed by: INTERNAL MEDICINE

## 2018-01-01 PROCEDURE — 80048 BASIC METABOLIC PNL TOTAL CA: CPT | Performed by: INTERNAL MEDICINE

## 2018-01-01 PROCEDURE — 81378 HLA I & II TYPING HR: CPT | Performed by: INTERNAL MEDICINE

## 2018-01-01 PROCEDURE — 85025 COMPLETE CBC W/AUTO DIFF WBC: CPT | Performed by: INTERNAL MEDICINE

## 2018-01-01 PROCEDURE — 97161 PT EVAL LOW COMPLEX 20 MIN: CPT | Mod: GP | Performed by: PHYSICAL THERAPIST

## 2018-01-01 PROCEDURE — 40000268 ZZH STATISTIC NO CHARGES: Mod: ZF

## 2018-01-01 PROCEDURE — 86828 HLA CLASS I&II ANTIBODY QUAL: CPT | Performed by: INTERNAL MEDICINE

## 2018-01-01 PROCEDURE — 80061 LIPID PANEL: CPT | Performed by: INTERNAL MEDICINE

## 2018-01-01 PROCEDURE — 36415 COLL VENOUS BLD VENIPUNCTURE: CPT | Performed by: INTERNAL MEDICINE

## 2018-01-01 PROCEDURE — 81370 HLA I & II TYPING LR: CPT | Performed by: INTERNAL MEDICINE

## 2018-01-01 PROCEDURE — 81376 HLA II TYPING 1 LOCUS LR: CPT | Performed by: INTERNAL MEDICINE

## 2018-01-01 PROCEDURE — G0463 HOSPITAL OUTPT CLINIC VISIT: HCPCS | Mod: ZF

## 2018-01-01 PROCEDURE — 97112 NEUROMUSCULAR REEDUCATION: CPT | Mod: GP | Performed by: PHYSICAL THERAPIST

## 2018-01-01 PROCEDURE — 36415 COLL VENOUS BLD VENIPUNCTURE: CPT

## 2018-01-01 PROCEDURE — 00000345 ZZHCL STATISTIC REV BONE MARROW OUTSIDE SLIDES TC 88321: Performed by: INTERNAL MEDICINE

## 2018-01-01 PROCEDURE — 86832 HLA CLASS I HIGH DEFIN QUAL: CPT | Performed by: INTERNAL MEDICINE

## 2018-01-01 PROCEDURE — G8979 MOBILITY GOAL STATUS: HCPCS | Mod: GP | Performed by: PHYSICAL THERAPIST

## 2018-01-01 PROCEDURE — G8978 MOBILITY CURRENT STATUS: HCPCS | Mod: GP | Performed by: PHYSICAL THERAPIST

## 2018-01-01 PROCEDURE — 97110 THERAPEUTIC EXERCISES: CPT | Mod: GP | Performed by: PHYSICAL THERAPIST

## 2018-01-01 RX ORDER — METFORMIN HCL 500 MG
500 TABLET, EXTENDED RELEASE 24 HR ORAL 2 TIMES DAILY WITH MEALS
Qty: 60 TABLET | Refills: 1 | Status: SHIPPED | OUTPATIENT
Start: 2018-01-01

## 2018-01-01 RX ORDER — TRAZODONE HYDROCHLORIDE 50 MG/1
TABLET, FILM COATED ORAL
Qty: 60 TABLET | Refills: 1 | Status: SHIPPED | OUTPATIENT
Start: 2018-01-01

## 2018-01-01 RX ORDER — GLIMEPIRIDE 4 MG/1
4 TABLET ORAL
Qty: 90 TABLET | Refills: 0 | Status: SHIPPED | OUTPATIENT
Start: 2018-01-01

## 2018-01-01 RX ORDER — INSULIN GLARGINE 300 U/ML
INJECTION, SOLUTION SUBCUTANEOUS
Qty: 6 ML | Refills: 1 | Status: SHIPPED | OUTPATIENT
Start: 2018-01-01 | End: 2018-01-01

## 2018-01-01 RX ORDER — SIMVASTATIN 20 MG
20 TABLET ORAL AT BEDTIME
Qty: 90 TABLET | Refills: 3 | Status: SHIPPED | OUTPATIENT
Start: 2018-01-01

## 2018-01-01 RX ORDER — ESZOPICLONE 3 MG/1
3 TABLET, FILM COATED ORAL
Qty: 30 TABLET | Status: CANCELLED | OUTPATIENT
Start: 2018-01-01

## 2018-01-01 RX ORDER — INSULIN GLARGINE 300 U/ML
85 INJECTION, SOLUTION SUBCUTANEOUS AT BEDTIME
Qty: 30 ML | Refills: 0 | Status: SHIPPED | OUTPATIENT
Start: 2018-01-01

## 2018-01-01 RX ORDER — LOSARTAN POTASSIUM 25 MG/1
12.5 TABLET ORAL DAILY
Qty: 90 TABLET | Refills: 0 | Status: SHIPPED | OUTPATIENT
Start: 2018-01-01

## 2018-01-01 ASSESSMENT — PAIN SCALES - GENERAL: PAINLEVEL: MODERATE PAIN (4)

## 2018-01-05 NOTE — MR AVS SNAPSHOT
"              After Visit Summary   1/5/2018    Darleen Ayala    MRN: [id number]           Patient Information     Date Of Birth          1955        Visit Information        Provider Department      1/5/2018 8:50 AM Mariluz Smith MD [facility]        Today's Diagnoses     Type 2 diabetes mellitus with complication, with long-term current use of insulin (H)    -  1    Acute myeloid leukemia not having achieved remission (H)          Care Instructions    I have sent a prescription for a new glucose meter and testing supplies-please check your blood sugars 2-3 times daily keep a log of the blood sugars and return to clinic in a month.      We will let you know your test results by Veset.    Please return to clinic in 1 month for follow-up of diabetes.           Follow-ups after your visit        Who to contact     If you have questions or need follow up information about today's clinic visit or your schedule please contact [facility] directly at [phone].  Normal or non-critical lab and imaging results will be communicated to you by Membersuitehart, letter or phone within 4 business days after the clinic has received the results. If you do not hear from us within 7 days, please contact the clinic through Orthoconet or phone. If you have a critical or abnormal lab result, we will notify you by phone as soon as possible.  Submit refill requests through Veset or call your pharmacy and they will forward the refill request to us. Please allow 3 business days for your refill to be completed.          Additional Information About Your Visit        [employer] Information     Veset lets you send messages to your doctor, view your test results, renew your prescriptions, schedule appointments and more. To sign up, go to [email or web address] . Click on \"Log in\" on the left side of the screen, which will take you to the Welcome page. Then click on \"Sign up Now\" on the right side " "of the page.     You will be asked to enter the access code listed below, as well as some personal information. Please follow the directions to create your username and password.     Your access code is: [email or web address]  Expires: 2018  9:42 AM     Your access code will  in 90 days. If you need help or a new code, please call your [facility] clinic or [phone].        Care EveryWhere ID     This is your Care EveryWhere ID. This could be used by other organizations to access your [facility] medical records  [id number]        Your Vitals Were     Pulse Temperature Respirations Height Pulse Oximetry BMI (Body Mass Index)    83 98.8  F (37.1  C) (Oral) 20 5' 5.7\" (1.669 m) 100% 26.88 kg/m2       Blood Pressure from Last 3 Encounters:   18 95/54   17 104/64   17 94/59    Weight from Last 3 Encounters:   18 165 lb (74.8 kg)   17 161 lb (73 kg)   17 167 lb 14.4 oz (76.2 kg)              We Performed the Following     Albumin Random Urine Quantitative with Creat Ratio     Basic metabolic panel     Lipid panel reflex to direct LDL Non-fasting     TSH with free T4 reflex          Today's Medication Changes          These changes are accurate as of: 18  9:42 AM.  If you have any questions, ask your nurse or doctor.               Start taking these medicines.        Dose/Directions    blood glucose lancets standard   Commonly known as:  no brand specified   Used for:  Type 2 diabetes mellitus with complication, with long-term current use of insulin (H)   Started by:  Mariluz Smith MD        Use to test blood sugar 2-3 times daily or as directed.   Quantity:  100 each   Refills:  11       blood glucose monitoring meter device kit   Commonly known as:  no brand specified   Used for:  Type 2 diabetes mellitus with complication, with long-term current use of insulin (H)   Started by:  Mariluz Smith MD        Use to test blood sugar 2-3 times daily or as " directed.   Quantity:  1 kit   Refills:  0       blood glucose monitoring test strip   Commonly known as:  no brand specified   Used for:  Type 2 diabetes mellitus with complication, with long-term current use of insulin (H)   Started by:  Mariluz Smith MD        Use to test blood sugars 2-3 times daily or as directed   Quantity:  100 strip   Refills:  3         These medicines have changed or have updated prescriptions.        Dose/Directions    losartan 25 MG tablet   Commonly known as:  COZAAR   This may have changed:  when to take this   Used for:  Acute myeloid leukemia not having achieved remission (H)   Changed by:  Mariluz Smith MD        Dose:  12.5 mg   Take 0.5 tablets (12.5 mg) by mouth daily   Quantity:  90 tablet   Refills:  0       metFORMIN 500 MG 24 hr tablet   Commonly known as:  GLUCOPHAGE-XR   This may have changed:  when to take this   Used for:  Acute myeloid leukemia not having achieved remission (H)   Changed by:  Mariluz Smith MD        Dose:  500 mg   Take 1 tablet (500 mg) by mouth 2 times daily (with meals)   Quantity:  60 tablet   Refills:  1       TOUJEO SOLOSTAR 300 UNIT/ML injection   This may have changed:  additional instructions   Used for:  Acute myeloid leukemia not having achieved remission (H)   Generic drug:  insulin glargine U-300   Changed by:  Mariluz Smith MD        INJECT 80 UNITS UNDER THE SKIN HS.   Quantity:  6 mL   Refills:  1            Where to get your medicines      These medications were sent to [facility] [address] [place] [address] [address] [facility] [address]  [address] [facility] [address]     Phone:  [phone]     blood glucose lancets standard    blood glucose monitoring meter device kit    blood glucose monitoring test strip    glimepiride 4 MG tablet    losartan 25 MG tablet    metFORMIN 500 MG 24 hr tablet    simvastatin 20 MG tablet    TOUJEO  SOLOSTAR 300 UNIT/ML injection                Primary Care Provider Office Phone # Fax #    [facility] [phone] [address] [facility] [address]        Equal Access to Services     REYES KAT : Rubina espino neliao Somaryali, waaxda luqadaha, qaybta kaalmada adeegyada, bob schmidtn praveen leija wilner navas. So [facility] [phone].    ATENCIÓN: Si habla español, tiene a duncan disposición servicios gratuitos de asistencia lingüística. Llame al [phone].    We comply with applicable federal civil rights laws and [place] laws. We do not discriminate on the basis of race, color, national origin, age, disability, sex, sexual orientation, or gender identity.            Thank you!     Thank you for choosing [facility]  for your care. Our goal is always to provide you with excellent care. Hearing back from our patients is one way we can continue to improve our services. Please take a few minutes to complete the written survey that you may receive in the mail after your visit with us. Thank you!             Your Updated Medication List - Protect others around you: Learn how to safely use, store and throw away your medicines at www.disposemymeds.org.          This list is accurate as of: 1/5/18  9:42 AM.  Always use your most recent med list.                   Brand Name Dispense Instructions for use Diagnosis    acyclovir 200 MG capsule    ZOVIRAX     Take 2 capsules by mouth        blood glucose lancets standard    no brand specified    100 each    Use to test blood sugar 2-3 times daily or as directed.    Type 2 diabetes mellitus with complication, with long-term current use of insulin (H)       blood glucose monitoring meter device kit    no brand specified    1 kit    Use to test blood sugar 2-3 times daily or as directed.    Type 2 diabetes mellitus with complication, with long-term current use of insulin (H)       blood glucose monitoring test strip    no  brand specified    100 strip    Use to test blood sugars 2-3 times daily or as directed    Type 2 diabetes mellitus with complication, with long-term current use of insulin (H)       eszopiclone 3 MG tablet    LUNESTA     Take 3 mg by mouth    Acute myeloid leukemia not having achieved remission (H)       glimepiride 4 MG tablet    AMARYL    90 tablet    Take 1 tablet (4 mg) by mouth every morning (before breakfast)    Acute myeloid leukemia not having achieved remission (H)       insulin pen needle 32G X 4 MM     100 each    Use  pen needles daily or as directed.    Acute myeloid leukemia not having achieved remission (H)       LORazepam 1 MG tablet    ATIVAN     Take 1 mg by mouth        losartan 25 MG tablet    COZAAR    90 tablet    Take 0.5 tablets (12.5 mg) by mouth daily    Acute myeloid leukemia not having achieved remission (H)       metFORMIN 500 MG 24 hr tablet    GLUCOPHAGE-XR    60 tablet    Take 1 tablet (500 mg) by mouth 2 times daily (with meals)    Acute myeloid leukemia not having achieved remission (H)       metoprolol 25 MG 24 hr tablet    TOPROL-XL     Take 12.5 mg by mouth    Acute myeloid leukemia not having achieved remission (H)       simvastatin 20 MG tablet    ZOCOR    90 tablet    Take 1 tablet (20 mg) by mouth At Bedtime    Acute myeloid leukemia not having achieved remission (H)       TOUJEO SOLOSTAR 300 UNIT/ML injection   Generic drug:  insulin glargine U-300     6 mL    INJECT 80 UNITS UNDER THE SKIN HS.    Acute myeloid leukemia not having achieved remission (H)

## 2018-01-05 NOTE — PATIENT INSTRUCTIONS
I have sent a prescription for a new glucose meter and testing supplies-please check your blood sugars 2-3 times daily keep a log of the blood sugars and return to clinic in a month.      We will let you know your test results by Tykoonhart.    Please return to clinic in 1 month for follow-up of diabetes.

## 2018-01-05 NOTE — LETTER
January 10, 2018    Darleen Ayala  [address]   [place] [address]          Dear Darleen,     Your blood sodium level appears to be slightly low but this is most likely due to your high blood sugars lately.  Your other labwork is within normal limits for you.    Please return to clinic in 1 month for follow-up of diabetes.     Please use [employer] or call our clinic at [phone] if you have any questions.     Mariluz Smith MD/lázaro    Results for orders placed or performed in visit on 01/05/18   Basic metabolic panel   Result Value Ref Range    Sodium 130 (L) 133 - 144 mmol/L    Potassium 4.5 3.4 - 5.3 mmol/L    Chloride 97 94 - 109 mmol/L    Carbon Dioxide 25 20 - 32 mmol/L    Anion Gap 8 3 - 14 mmol/L    Glucose 230 (H) 70 - 99 mg/dL    Urea Nitrogen 10 7 - 30 mg/dL    Creatinine 0.71 0.52 - 1.04 mg/dL    GFR Estimate 83 >60 mL/min/1.7m2    GFR Estimate If Black >90 >60 mL/min/1.7m2    Calcium 8.8 8.5 - 10.1 mg/dL   TSH with free T4 reflex   Result Value Ref Range    TSH 2.51 0.40 - 4.00 mU/L   Lipid panel reflex to direct LDL Non-fasting   Result Value Ref Range    Cholesterol 176 <200 mg/dL    Triglycerides 134 <150 mg/dL    HDL Cholesterol 69 >49 mg/dL    LDL Cholesterol Calculated 80 <100 mg/dL    Non HDL Cholesterol 107 <130 mg/dL

## 2018-01-05 NOTE — TELEPHONE ENCOUNTER
MAMMO DIGITAL SCREENING BI11/7/2016  [facility]   Result Impression     #[id number] - MAMMO DIGITAL SCREENING BI  BILATERAL DIGITAL SCREENING MAMMOGRAM WITH CAD: 11/7/2016    COMPARISON: Comparison is made to exam dated:  6/19/2012 mammogram - [facility].      FINDINGS: There are scattered fibroglandular elements in the both breasts.    Current study was also evaluated with a Computer Aided Detection (CAD) system.    No significant masses, calcifications, or other findings are seen in either breast.    There has been no significant interval change.    IMPRESSION: NEGATIVE  There is no mammographic evidence of malignancy. A 1 year screening mammogram is recommended.    The patient will be notified of the results by mail.        Magda ha/omega:11/7/2016 13:08:41        letter sent: Normal Letter    Mammogram BI-RADS: 1 Negative   Status Results Details

## 2018-01-05 NOTE — PROGRESS NOTES
RUTHI your patient was unable to void today for the Albumin Random Urine test that you order. I reordered the test as a future test.  Jocelyn Sparks.

## 2018-01-05 NOTE — PROGRESS NOTES
SUBJECTIVE:   Darleen Ayala is a 62 year old female who presents to clinic today for the following health issues:      Diabetes Follow-up      Patient is checking blood sugars: not at all    Diabetic concerns: None     Symptoms of hypoglycemia (low blood sugar): none     Paresthesias (numbness or burning in feet) or sores: No     Date of last diabetic eye exam: 01.01.17    BP Readings from Last 2 Encounters:   01/05/18 95/54   11/20/17 104/64     Hemoglobin A1C (%)   Date Value   11/16/2017 9.3 (H)     LDL Cholesterol Calculated (mg/dL)   Date Value   01/05/2018 80      Hyperlipidemia Follow-Up      Rate your low fat/cholesterol diet?: not monitoring fat    Taking statin?  Yes, no muscle aches from statin    Other lipid medications/supplements?:  none          Amount of exercise or physical activity: None    Problems taking medications regularly: No    Medication side effects: none  Diet: regular (no restrictions)    Leukemia (AML):  Patient is due to have her next bone marrow biopsy on 1.15.18; she recently completed a course of chemotx.  She is followed by Heme/Onc at [facility].            Reviewed and updated as needed this visit by clinical staffTobacco  Allergies  Meds  Problems       Reviewed and updated as needed this visit by Provider  Allergies  Meds  Problems           Patient Active Problem List   Diagnosis     AML (acute myeloid leukemia) (H)     Type 2 diabetes mellitus with complication, with long-term current use of insulin (H)     Coronary artery disease involving native heart without angina pectoris, unspecified vessel or lesion type     Bilateral carotid artery disease (H)     ILSA (generalized anxiety disorder)     Insomnia, unspecified type     Mixed hyperlipidemia       No past medical history on file.    No past surgical history on file.    No family history on file.    Social History   Substance Use Topics     Smoking status: Current Some Day Smoker     Types: Cigarettes      "Smokeless tobacco: Never Used     Alcohol use Not on file       Current Outpatient Prescriptions   Medication     blood glucose monitoring (NO BRAND SPECIFIED) meter device kit     blood glucose (NO BRAND SPECIFIED) lancets standard     blood glucose monitoring (NO BRAND SPECIFIED) test strip     TOUJEO SOLOSTAR 300 UNIT/ML injection     simvastatin (ZOCOR) 20 MG tablet     metFORMIN (GLUCOPHAGE-XR) 500 MG 24 hr tablet     losartan (COZAAR) 25 MG tablet     glimepiride (AMARYL) 4 MG tablet     acyclovir (ZOVIRAX) 200 MG capsule     LORazepam (ATIVAN) 1 MG tablet     insulin pen needle 32G X 4 MM     eszopiclone (LUNESTA) 3 MG tablet     metoprolol (TOPROL-XL) 25 MG 24 hr tablet     No current facility-administered medications for this visit.          ROS:  Constitutional, HEENT, cardiovascular, pulmonary, GI, , musculoskeletal, neuro, skin, endocrine and psych systems are negative, except as otherwise noted.     OBJECTIVE:                                                    BP 95/54  Pulse 83  Temp 98.8  F (37.1  C) (Oral)  Resp 20  Ht 5' 5.7\" (1.669 m)  Wt 165 lb (74.8 kg)  SpO2 100%  BMI 26.88 kg/m2     GENERAL APPEARANCE: healthy, alert and in no distress; hair regrowing the the scalp  EYES: Eyes grossly normal to inspection, and conjunctivae and sclerae normal  HENT: head normocephalic and atraumatic and mouth without ulcers or lesions, oropharynx clear and oral mucous membranes moist  NECK: no noticeable adenopathy, no asymmetry, masses, or scars   RESP: lungs clear to auscultation - no rales, rhonchi or wheezes  CV: regular rate and rhythm, normal S1 S2, no S3 or S4, no murmur, click or rub, no peripheral edema and peripheral pulses strong  ABDOMEN: soft, nontender, no hepatosplenomegaly, no masses and bowel sounds normal  MS: no musculoskeletal defects are noted and gait is age appropriate without ataxia  SKIN: no suspicious lesions or rashes  NEURO: mentation intact and speech normal  PSYCH: " mentation appears normal and affect normal/bright.    Results for orders placed or performed in visit on 01/05/18   Basic metabolic panel   Result Value Ref Range    Sodium 130 (L) 133 - 144 mmol/L    Potassium 4.5 3.4 - 5.3 mmol/L    Chloride 97 94 - 109 mmol/L    Carbon Dioxide 25 20 - 32 mmol/L    Anion Gap 8 3 - 14 mmol/L    Glucose 230 (H) 70 - 99 mg/dL    Urea Nitrogen 10 7 - 30 mg/dL    Creatinine 0.71 0.52 - 1.04 mg/dL    GFR Estimate 83 >60 mL/min/1.7m2    GFR Estimate If Black >90 >60 mL/min/1.7m2    Calcium 8.8 8.5 - 10.1 mg/dL   TSH with free T4 reflex   Result Value Ref Range    TSH 2.51 0.40 - 4.00 mU/L   Lipid panel reflex to direct LDL Non-fasting   Result Value Ref Range    Cholesterol 176 <200 mg/dL    Triglycerides 134 <150 mg/dL    HDL Cholesterol 69 >49 mg/dL    LDL Cholesterol Calculated 80 <100 mg/dL    Non HDL Cholesterol 107 <130 mg/dL       No results found for this or any previous visit (from the past 744 hour(s)).    The 10-year ASCVD risk score ([place] NICK Jr, et al., 2013) is: 9.2%    Values used to calculate the score:      Age: 62 years      Sex: Female      Is Non- : No      Diabetic: Yes      Tobacco smoker: Yes      Systolic Blood Pressure: 95 mmHg      Is BP treated: Yes      HDL Cholesterol: 69 mg/dL      Total Cholesterol: 176 mg/dL      ASSESSMENT/PLAN:                                                        ICD-10-CM    1. Type 2 diabetes mellitus with complication, with long-term current use of insulin (H) E11.8 blood glucose monitoring (NO BRAND SPECIFIED) meter device kit    Z79.4 blood glucose (NO BRAND SPECIFIED) lancets standard     blood glucose monitoring (NO BRAND SPECIFIED) test strip     Basic metabolic panel     TSH with free T4 reflex     Lipid panel reflex to direct LDL Non-fasting     Albumin Random Urine Quantitative with Creat Ratio     CANCELED: Albumin Random Urine Quantitative with Creat Ratio   2. Acute myeloid leukemia not having  achieved remission (H) C92.00 TOUJEO SOLOSTAR 300 UNIT/ML injection     simvastatin (ZOCOR) 20 MG tablet     metFORMIN (GLUCOPHAGE-XR) 500 MG 24 hr tablet     losartan (COZAAR) 25 MG tablet     glimepiride (AMARYL) 4 MG tablet   3. Hyponatremia E87.1      1:  Lab Results   Component Value Date    A1C 9.3 11/16/2017     ASSESSMENT uncontrolled DM but would like more day-to-day blood glucose data in order to safely adjust the patient's insulin.  PLAN:  As per orders above and patient instructions below.    2: AML undergoing chemotx. (see HPI); followed by Heme[facility]Onc at [facility]. PLAN: Continue current regimen and follow-up with specialists.     3: low serum sodium-suspect at least partially due to pseudohyponatremia d/t elevated glucose +/- SIADH from her AML. PLAN: DM treatment and plan on rechecking the sodium at the next appointment.    Patient Instructions   I have sent a prescription for a new glucose meter and testing supplies-please check your blood sugars 2-3 times daily keep a log of the blood sugars and return to clinic in a month.      We will let you know your test results by MyChart.    Please return to clinic in 1 month for follow-up of diabetes.       Mariluz Smith MD    [facility]  [address] [facility] [address] [phone]

## 2018-01-07 NOTE — LETTER
[facility]  [address] [facility] [address]  Phone: [phone]  Fax: [phone]    01/08/18    Darleen Ayala  [address] [facility] [address]          Dear Darleen,      The ENT (Ear Nose and Throat) doctor has gotten back to me about your soreness for that long after nasal packing, and said it is unusual to have nasal soreness 2 months after the packing.  I would encourage you to make sure that your living quarters are not too dry-and to use a humidifier or hang a few damp towels to humidify your surroundings (as this may impact the tenderness of your nose). Additionally, avoid unnecessarily rubbing the nose or excessive nose-blowing. If, despite these measures,  your nose continues to feel sore in the next few (2-4) weeks, please let me know and I can refer you to the ENT doctor to take a closer look inside.     Please call our clinic at [phone] if you have any questions.     Mariluz Smith MD/klf

## 2018-01-08 NOTE — TELEPHONE ENCOUNTER
Please send a letter with the following text, to the patient:    Dear Darleen,     The ENT (Ear Nose and Throat) doctor has gotten back to me about your soreness for that long after nasal packing, and said it is unusual to have nasal soreness 2 months after the packing.  I would encourage you to make sure that your living quarters are not too dry-and to use a humidifier or hang a few damp towels to humidify your surroundings (as this may impact the tenderness of your nose). Additionally, avoid unnecessarily rubbing the nose or excessive nose-blowing. If, despite these measures,  your nose continues to feel sore in the next few (2-4) weeks, please let me know and I can refer you to the ENT doctor to take a closer look inside.    Please call our clinic at [phone] if you have any questions.    Mariluz Smith MD

## 2018-01-10 NOTE — PROGRESS NOTES
Please send letter informing the patient and attach results:    Dear Darleen,     Your blood sodium level appears to be slightly low but this is most likely due to your high blood sugars lately.  Your other labwork is within normal limits for you.    Please return to clinic in 1 month for follow-up of diabetes.     Please use [employer] or call our clinic at [phone] if you have any questions.     Mariluz Smith MD

## 2018-01-25 NOTE — PROGRESS NOTES
"  SUBJECTIVE:   Darleen Ayala is a 62 year old female who presents to clinic today for the following health issues:      Insomnia  Patient has been in Lunesta before, she called in last week with a request for a refill.  We'd relayed the following to her by telephone:  \"As this is a controlled substance and because the patient appears to have been prescribed ativan from her Oncology office, on 1.13.18, we'll need to make sure that the medication combination that the patient is on is safe.\"  She is here for follow-up (she is also due for diabetes follow-up now).  She reports the last Lunesta prescription was in 11/2017 at [facility] in [place].Per the , the last prescription was given on 12.15.17, dispensed on 12.29.17 (30 tabs; also has had 30 tabs Rxes, about 1-3 months, since 1/2017 , per the ).  I asked the patient today if she had gotten any ativan recently--she replied per her recollection that it was in early January, 2018 by Oncology. Per the Reconcile Dispensed history, it was written on 1.13.18-interestingly, this is not on the , which was run on 1.18.18, but this is possible due to the several day lag which can exist in the .  The patient reports that she has now run out of the Ativan.   I reminded her about the concerns with taking Lunesta and Ativan together, but she reported that the whole reason why she got the Ativan from the Oncologist was because she did not have the Lunesta. However, per the records shown above, she got the Ativan prescription when she still had 2 weeks' worth of Lunesta left (it was dispensed on 12.19.17). The call to us for the Lunesta came in on 1.18.18, 5 days after being prescribed the Ativan.  I asked the patient what she has tried before for lack of sleep: she replied melatonin and another prescription medication but she cannot recall the name. She did report trying the Trazodone for a few days and it did not work. She does not recall the dose tried, " but it appears to have been a lower dose (25 or 50mg-100mg max).      Diabetes Follow-up      Patient is checking blood sugars: rarely.      Diabetic concerns: None     Symptoms of hypoglycemia (low blood sugar): none     Paresthesias (numbness or burning in feet) or sores: No     Date of last diabetic eye exam: patient is seeing Ophthalmology frequently due to having an eye lens placed recently and some irritation with the lens (I see dilation of the left pupil, which the patient reports as been there for several days at least and Ophtho is aware of; she plans to call and see them today; I encouraged her to do so and to schedule with our Optometrist at least, if she cannot get in with her Ophthalmologist, (to be able to assess for any possible Ophtho urgencies and assess the conjunctival irritation).    The eye issues have made it harder to write down the blood sugars. Our plan 3 weeks ago was for her to keep a blood sugar log and bring it today with her.   She is taking Tresiba as 85u qhs, confirmed today, and the oral medications as per med list.  Her partner is dialing up the insulin pen for her (daily). I asked if her partner can write down the blood sugars as well, as she said that they could, and I gave her a new blood sugar booklet.  She does continue to smoke.    BP Readings from Last 2 Encounters:   01/26/18 106/68   01/05/18 95/54     Hemoglobin A1C (%)   Date Value   11/16/2017 9.3 (H)     LDL Cholesterol Calculated (mg/dL)   Date Value   01/05/2018 80     AML:  Patient is following with Oncology-she will be getting results of her recent BMB  (bone marrow biopsy) within the next few days.        Reviewed and updated as needed this visit by clinical staff  Tobacco  Allergies  Meds  Problems       Reviewed and updated as needed this visit by Provider  Problems            Patient Active Problem List   Diagnosis     AML (acute myeloid leukemia) (H)     Type 2 diabetes mellitus with complication, with  "long-term current use of insulin (H)     Coronary artery disease involving native heart without angina pectoris, unspecified vessel or lesion type     Bilateral carotid artery disease (H)     ILSA (generalized anxiety disorder)     Insomnia, unspecified type     Mixed hyperlipidemia       No past medical history on file.    No past surgical history on file.    No family history on file.    Social History   Substance Use Topics     Smoking status: Current Some Day Smoker     Types: Cigarettes     Smokeless tobacco: Never Used     Alcohol use Not on file       Current Outpatient Prescriptions   Medication     traZODone (DESYREL) 50 MG tablet     TOUJEO SOLOSTAR 300 UNIT/ML injection     ASPIRIN NOT PRESCRIBED (INTENTIONAL)     blood glucose monitoring (NO BRAND SPECIFIED) meter device kit     blood glucose (NO BRAND SPECIFIED) lancets standard     blood glucose monitoring (NO BRAND SPECIFIED) test strip     simvastatin (ZOCOR) 20 MG tablet     metFORMIN (GLUCOPHAGE-XR) 500 MG 24 hr tablet     losartan (COZAAR) 25 MG tablet     glimepiride (AMARYL) 4 MG tablet     acyclovir (ZOVIRAX) 200 MG capsule     insulin pen needle 32G X 4 MM     eszopiclone (LUNESTA) 3 MG tablet     metoprolol (TOPROL-XL) 25 MG 24 hr tablet     [DISCONTINUED] TOUJEO SOLOSTAR 300 UNIT/ML injection     No current facility-administered medications for this visit.          ROS:  Constitutional, HEENT, cardiovascular, pulmonary, GI, , musculoskeletal, neuro, skin, endocrine and psych systems are negative, except as otherwise noted.     OBJECTIVE:                                                    /68  Pulse 77  Temp 96.9  F (36.1  C) (Oral)  Resp 18  Ht 5' 5.75\" (1.67 m)  Wt 167 lb (75.8 kg)  SpO2 99%  BMI 27.16 kg/m2     GENERAL APPEARANCE: healthy, alert and in no distress  EYES: Eyes grossly normal to inspection, and left conjunctival injection and L pupil dilation [not acute] and sclerae normal    NEURO: mentation intact and speech " normal  PSYCH: mentation appears normal and affect normal/bright.    Results for orders placed or performed in visit on 01/05/18   Basic metabolic panel   Result Value Ref Range    Sodium 130 (L) 133 - 144 mmol/L    Potassium 4.5 3.4 - 5.3 mmol/L    Chloride 97 94 - 109 mmol/L    Carbon Dioxide 25 20 - 32 mmol/L    Anion Gap 8 3 - 14 mmol/L    Glucose 230 (H) 70 - 99 mg/dL    Urea Nitrogen 10 7 - 30 mg/dL    Creatinine 0.71 0.52 - 1.04 mg/dL    GFR Estimate 83 >60 mL/min/1.7m2    GFR Estimate If Black >90 >60 mL/min/1.7m2    Calcium 8.8 8.5 - 10.1 mg/dL   TSH with free T4 reflex   Result Value Ref Range    TSH 2.51 0.40 - 4.00 mU/L   Lipid panel reflex to direct LDL Non-fasting   Result Value Ref Range    Cholesterol 176 <200 mg/dL    Triglycerides 134 <150 mg/dL    HDL Cholesterol 69 >49 mg/dL    LDL Cholesterol Calculated 80 <100 mg/dL    Non HDL Cholesterol 107 <130 mg/dL       No results found for this or any previous visit (from the past 744 hour(s)).      ASSESSMENT/PLAN:                                                        ICD-10-CM    1. Insomnia, unspecified type G47.00 traZODone (DESYREL) 50 MG tablet   2. Acute myeloid leukemia not having achieved remission (H) C92.00 TOUJEO SOLOSTAR 300 UNIT/ML injection   3. Type 2 diabetes mellitus with complication, with long-term current use of insulin (H) E11.8 ASPIRIN NOT PRESCRIBED (INTENTIONAL)    Z79.4    4. Bilateral carotid artery disease (H) I77.9          Time spent coordinating care was approximately 30 minutes out of a total of 39 minutes or less (which was the total duration of the appointment) including the diagnosis, prognosis and treatment of the above medical conditions.     1: uncontrolled PLAN: try a safer regimen: As per orders above and patient instructions below.   2: managed by Heme/Onc, recent BMP PLAN: Continue current regimen and follow-up with specialists.   3: uncontrolled, not yet time for A1C, has not yet started writing down blood  sugars, PLAN: partner will start helping with this, as per HPI and rtc 1 month  4: stable, no reported carotid changes: PLAN: continue statin and not on aspirin (AML)    Patient Instructions   Please try the Trazodone as per prescription instructions.    Please quit smoking.     I have sent a prescription for a new glucose meter and testing supplies (3 weeks ago)-please check your blood sugars 2-3 times daily keep a log of the blood sugars and return to clinic in a month.      For your ongoing eye symptoms: please make sure to see the Ophthalmologist today, as you plan to do.         Please return to clinic in 1 month for follow-up of diabetes.       Mariluz Smith MD    [facility]  [address] [facility] [place] [address] [phone]

## 2018-01-26 NOTE — PATIENT INSTRUCTIONS
Please try the Trazodone as per prescription instructions.    Please quit smoking.     I have sent a prescription for a new glucose meter and testing supplies (3 weeks ago)-please check your blood sugars 2-3 times daily keep a log of the blood sugars and return to clinic in a month.      For your ongoing eye symptoms: please make sure to see the Ophthalmologist today, as you plan to do.         Please return to clinic in 1 month for follow-up of diabetes.

## 2018-01-26 NOTE — MR AVS SNAPSHOT
After Visit Summary   1/26/2018    Darleen Ayala    MRN: [id number]           Patient Information     Date Of Birth          1955        Visit Information        Provider Department      1/26/2018 7:10 AM Mariluz Smith MD [facility]        Today's Diagnoses     Insomnia, unspecified type    -  1    Acute myeloid leukemia not having achieved remission (H)        Type 2 diabetes mellitus with complication, with long-term current use of insulin (H)          Care Instructions    Please try the Trazodone as per prescription instructions.    Please quit smoking.     I have sent a prescription for a new glucose meter and testing supplies (3 weeks ago)-please check your blood sugars 2-3 times daily keep a log of the blood sugars and return to clinic in a month.      For your ongoing eye symptoms: please make sure to see the Ophthalmologist today, as you plan to do.         Please return to clinic in 1 month for follow-up of diabetes.           Follow-ups after your visit        Who to contact     If you have questions or need follow up information about today's clinic visit or your schedule please contact [facility] directly at [phone].  Normal or non-critical lab and imaging results will be communicated to you by MyChart, letter or phone within 4 business days after the clinic has received the results. If you do not hear from us within 7 days, please contact the clinic through [employer] or phone. If you have a critical or abnormal lab result, we will notify you by phone as soon as possible.  Submit refill requests through [employer] or call your pharmacy and they will forward the refill request to us. Please allow 3 business days for your refill to be completed.          Additional Information About Your Visit        [employer] Information     [employer] lets you send messages to your doctor, view your test results, renew your prescriptions, schedule appointments  "and more. To sign up, go to [email or web address] . Click on \"Log in\" on the left side of the screen, which will take you to the Welcome page. Then click on \"Sign up Now\" on the right side of the page.     You will be asked to enter the access code listed below, as well as some personal information. Please follow the directions to create your username and password.     Your access code is: [email or web address]  Expires: 2018  9:42 AM     Your access code will  in 90 days. If you need help or a new code, please call your [place] clinic or [phone].        Care EveryWhere ID     This is your Care EveryWhere ID. This could be used by other organizations to access your [place] medical records  [id number]        Your Vitals Were     Pulse Temperature Respirations Height Pulse Oximetry BMI (Body Mass Index)    77 96.9  F (36.1  C) (Oral) 18 5' 5.75\" (1.67 m) 99% 27.16 kg/m2       Blood Pressure from Last 3 Encounters:   18 106/68   18 95/54   17 104/64    Weight from Last 3 Encounters:   18 167 lb (75.8 kg)   18 165 lb (74.8 kg)   17 161 lb (73 kg)              Today, you had the following     No orders found for display         Today's Medication Changes          These changes are accurate as of 18  7:42 AM.  If you have any questions, ask your nurse or doctor.               Start taking these medicines.        Dose/Directions    traZODone 50 MG tablet   Commonly known as:  DESYREL   Used for:  Insomnia, unspecified type   Started by:  Mariluz Smith MD        If tTake 1 tablet (50 mg) by mouth nightly as needed for sleep for 3 days and then increase to 100mg at night.   Quantity:  60 tablet   Refills:  1         These medicines have changed or have updated prescriptions.        Dose/Directions    TOUJEO SOLOSTAR 300 UNIT/ML injection   This may have changed:    - how much to take  - how to take this  - when to take this  - additional instructions   Used " for:  Acute myeloid leukemia not having achieved remission (H)   Generic drug:  insulin glargine U-300   Changed by:  Mariluz Smith MD        Dose:  85 Units   Inject 85 Units Subcutaneous At Bedtime   Quantity:  30 mL   Refills:  0         Stop taking these medicines if you haven't already. Please contact your care team if you have questions.     LORazepam 1 MG tablet   Commonly known as:  ATIVAN   Stopped by:  Mariluz Smith MD                Where to get your medicines      These medications were sent to [facility] [id number] [place] [address] [address]  [address], [facility] [address]     Phone:  [phone]     TOUJEO SOLOSTAR 300 UNIT/ML injection    traZODone 50 MG tablet                Primary Care Provider Office Phone # Fax #    Mariluz Smith [phone] [address] [facility] [address]        Equal Access to Services     [facility]: Hadii aad ku hadasho Soomaali, waaxda luqadaha, qaybta kaalmada adeegyada, waxay idiin hayjennifer darby . So [facility] [phone].    ATENCIÓN: Si habla español, tiene a duncan disposición servicios gratuitos de asistencia lingüística. Llame al [phone].    We comply with applicable federal civil rights laws and [place] laws. We do not discriminate on the basis of race, color, national origin, age, disability, sex, sexual orientation, or gender identity.            Thank you!     Thank you for choosing [facility]  for your care. Our goal is always to provide you with excellent care. Hearing back from our patients is one way we can continue to improve our services. Please take a few minutes to complete the written survey that you may receive in the mail after your visit with us. Thank you!             Your Updated Medication List - Protect others around you: Learn how to safely use, store and throw away your  medicines at www.disposemymeds.org.          This list is accurate as of 1/26/18  7:42 AM.  Always use your most recent med list.                   Brand Name Dispense Instructions for use Diagnosis    acyclovir 200 MG capsule    ZOVIRAX     Take 2 capsules by mouth        blood glucose lancets standard    no brand specified    100 each    Use to test blood sugar 2-3 times daily or as directed.    Type 2 diabetes mellitus with complication, with long-term current use of insulin (H)       blood glucose monitoring meter device kit    no brand specified    1 kit    Use to test blood sugar 2-3 times daily or as directed.    Type 2 diabetes mellitus with complication, with long-term current use of insulin (H)       blood glucose monitoring test strip    no brand specified    100 strip    Use to test blood sugars 2-3 times daily or as directed    Type 2 diabetes mellitus with complication, with long-term current use of insulin (H)       eszopiclone 3 MG tablet    LUNESTA     Take 3 mg by mouth    Acute myeloid leukemia not having achieved remission (H)       glimepiride 4 MG tablet    AMARYL    90 tablet    Take 1 tablet (4 mg) by mouth every morning (before breakfast)    Acute myeloid leukemia not having achieved remission (H)       insulin pen needle 32G X 4 MM     100 each    Use  pen needles daily or as directed.    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)       metoprolol succinate 25 MG 24 hr tablet    TOPROL-XL     Take 12.5 mg by mouth    Acute myeloid leukemia not having achieved remission (H)       simvastatin 20 MG tablet    ZOCOR    90 tablet    Take 1  tablet (20 mg) by mouth At Bedtime    Acute myeloid leukemia not having achieved remission (H)       TOUJEO SOLOSTAR 300 UNIT/ML injection   Generic drug:  insulin glargine U-300     30 mL    Inject 85 Units Subcutaneous At Bedtime    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    Insomnia, unspecified type

## 2018-02-21 PROBLEM — M62.81 GENERALIZED MUSCLE WEAKNESS: Status: ACTIVE | Noted: 2018-01-01

## 2018-02-21 NOTE — LETTER
[facility]  [address]  [facility] [address]  [phone]    2018    Re: Darleen Ayala   :   1955  MRN:  [id number]   REFERRING PHYSICIAN:   Joelle Farmer  [facility]  Date of Initial Evaluation: 2018  Visits:  Rxs Used: 1  Reason for Referral:  Generalized muscle weakness  EVALUATION SUMMARY  [facility]  Subjective:  Patient is a 62 year old female presenting with rehab general hpi. The history is provided by the patient. No  was used.   Darleen Ayala is a 62 year old female with weakness (Cancer treatment) condition which occurred with other.      This is a chronic condition  AML Leukemia Dx 17;  Last treatment 17 Discharged from facility after treatment  Referred to PT.    Site of Pain: has no pain.  Quality: no pain.       Associated symptoms:  Loss of strength, loss of balance and fatigue. Pain is the same all the time.  Symptoms are exacerbated by activity, standing, walking, ascending stairs, bending/squatting, using arm(s) overhead, using arm(s) at shoulder level and other (pushing and pulling items with arms) and relieved by rest. Since onset symptoms are unchanged.           Pertinent medical history includes:  Cancer, diabetes, heart problems and smoking.Medical allergies: yes (Lovaquin, Tetnis, diladid).Other surgeries include:  Heart surgery (stint).Current medications:  Cardiac medication, sleep medication and other (Diabetes medication).  Current occupation is ; pt is currently not working. Patient is currently not working due to present treatment problem.       Barriers include:  Stairs.  Barriers to activity include. Pt has been diagnosed with Type 2 diabetes.  Objective:  Standing Alignment:    Cervical/Thoracic:  Forward head  Shoulder/UE:  Rounded shoulders  Lumbar:  Lordosis decr  Lumbar/SI  Evaluation  ROM:    AROM Lumbar:   Flexion:          Mid shin  Ext:                    10 degrees beyond neutral spine   Side Bend:        Left:  Mid thigh    Right:  Mid thigh  Rotation:           Left:     Right:   Side Glide:        Left:     Right:    Cervical/Thoracic Evaluation  Cervical AROM: normal     Shoulder Evaluation:  ROM:  AROM:  normal  Strength:    Flexion: Left:4/5   Pain:    Right: 4/5     Pain:   Extension:  Left: 5/5    Pain:    Right: 5/5    Pain:  Abduction:  Left: 4-/5  Pain:    Right: 4/5     Pain:  External Rotation:   Left:4/5     Pain:   Right:4/5     Pain:    Elbow Flexion:  Left:4+/5     Pain:    Right:4/5     Pain:  Elbow Extension:  Left:4/5     Pain:    Right:4/5     Pain:  Hip Evaluation  Hip PROM:  Hip PROM:  Left Hip:    Normal  Right Hip:  Normal  Hip Strength:    Flexion:   Left: 4/5   Pain:  Right: 4/5   Pain:  Extension:  Left: 4-/5  Pain:Right: 4-/5    Pain:             Re: Darleen Ayala             :   1955  Abduction:  Left: 4/5     Pain:Right: 4/5    Pain:  Knee Evaluation:  ROM:  AROM: normal    Strength:   Extension:  Left: 4/5   Pain:      Right: 4+/5   Pain:  Flexion:  Left: 4+/5   Pain:      Right: 4+/5   Pain:    Assessment/Plan:    Patient is a 62 year old female with general weakness complaints.    Patient has the following significant findings with corresponding treatment plan.                Diagnosis 1:  Generalized weakness  Pain -  hot/cold therapy  Decreased strength - therapeutic exercise, therapeutic activities and home program  Impaired gait - gait training and home program  Impaired muscle performance - neuro re-education and home program  Decreased function - therapeutic activities and home program    Therapy Evaluation Codes:   1) History comprised of:   Personal factors that impact the plan of care:      Past/current experiences.    Comorbidity factors that impact the plan of care are:      Cancer and Diabetes.     Medications impacting  care: Cardiac and High blood pressure.  2) Examination of Body Systems comprised of:   Body structures and functions that impact the plan of care:      Upper and Lower Extremities.   Activity limitations that impact the plan of care are:      Lifting, Squatting/kneeling, Stairs, Standing, Walking and reaching.  3) Clinical presentation characteristics are:   Stable/Uncomplicated.  4) Decision-Making    Low complexity using standardized patient assessment instrument and/or measureable assessment of functional outcome.  Cumulative Therapy Evaluation is: Low complexity.    Previous and current functional limitations:  (See Goal Flow Sheet for this information)    Short term and Long term goals: (See Goal Flow Sheet for this information)   Communication ability:  Patient appears to be able to clearly communicate and understand verbal and written communication and follow directions correctly.  Treatment Explanation - The following has been discussed with the patient:   RX ordered/plan of care  Anticipated outcomes  Possible risks and side effects  This patient would benefit from PT intervention to resume normal activities.   Rehab potential is good.  Frequency:  1 X week, once daily  Duration:  For 2 months on an every other week basis (1x/week x2 x/month x 2 months)  Discharge Plan:  Achieve all LTG.  Independent in home treatment program.  Reach maximal therapeutic benefit.  Please refer to the daily flowsheet for treatment today, total treatment time and time spent performing 1:1 timed codes.     Thank you for your referral.    INQUIRIES  Therapist: Natalie Sanford, [facility]  [facility]  [address]  [place] [address]  Phone: [phone]  Fax: [phone]

## 2018-02-21 NOTE — PROGRESS NOTES
[facility] Initial Evaluation  Subjective:  Patient is a 62 year old female presenting with rehab general hpi. The history is provided by the patient. No  was used.   Darleen Ayala is a 62 year old female with weakness (Cancer treatment) condition which occurred with other.      This is a chronic condition  AML Leukemia Dx 7/23/17;  Last treatment 12/13/17 Discharged from facility after treatment    Referred to PT.    Site of Pain: has no pain.  Quality: no pain.       Associated symptoms:  Loss of strength, loss of balance and fatigue. Pain is the same all the time.  Symptoms are exacerbated by activity, standing, walking, ascending stairs, bending/squatting, using arm(s) overhead, using arm(s) at shoulder level and other (pushing and pulling items with arms) and relieved by rest. Since onset symptoms are unchanged.           Pertinent medical history includes:  Cancer, diabetes, heart problems and smoking.Medical allergies: yes (Lovaquin, Tetnis, diladid).Other surgeries include:  Heart surgery (stint).Current medications:  Cardiac medication, sleep medication and other (Diabetes medication).  Current occupation is ; pt is currently not working. Patient is currently not working due to present treatment problem.         Barriers include:  Stairs.    Barriers to activity include. Pt has been diagnosed with Type 2 diabetes.                            Objective:  Standing Alignment:    Cervical/Thoracic:  Forward head  Shoulder/UE:  Rounded shoulders  Lumbar:  Lordosis decr                           Lumbar/SI Evaluation  ROM:    AROM Lumbar:   Flexion:          Mid shin  Ext:                    10 degrees beyond neutral spine   Side Bend:        Left:  Mid thigh    Right:  Mid thigh  Rotation:           Left:     Right:   Side Glide:        Left:     Right:                                  Cervical/Thoracic Evaluation  Cervical AROM: normal                                   Shoulder Evaluation:  ROM:  AROM:  normal                                  Strength:    Flexion: Left:4/5   Pain:    Right: 4/5     Pain:   Extension:  Left: 5/5    Pain:    Right: 5/5    Pain:  Abduction:  Left: 4-/5  Pain:    Right: 4/5     Pain:      External Rotation:   Left:4/5     Pain:   Right:4/5     Pain:        Elbow Flexion:  Left:4+/5     Pain:    Right:4/5     Pain:  Elbow Extension:  Left:4/5     Pain:    Right:4/5     Pain:                                Hip Evaluation  Hip PROM:  Hip PROM:  Left Hip:    Normal  Right Hip:  Normal                          Hip Strength:    Flexion:   Left: 4/5   Pain:  Right: 4/5   Pain:                    Extension:  Left: 4-/5  Pain:Right: 4-/5    Pain:    Abduction:  Left: 4/5     Pain:Right: 4/5    Pain:                           Knee Evaluation:  ROM:  AROM: normal            Strength:     Extension:  Left: 4/5   Pain:      Right: 4+/5   Pain:  Flexion:  Left: 4+/5   Pain:      Right: 4+/5   Pain:                        General     ROS    Assessment/Plan:    Patient is a 62 year old female with general weakness complaints.    Patient has the following significant findings with corresponding treatment plan.                Diagnosis 1:  Generalized weakness  Pain -  hot/cold therapy  Decreased strength - therapeutic exercise, therapeutic activities and home program  Impaired gait - gait training and home program  Impaired muscle performance - neuro re-education and home program  Decreased function - therapeutic activities and home program    Therapy Evaluation Codes:   1) History comprised of:   Personal factors that impact the plan of care:      Past/current experiences.    Comorbidity factors that impact the plan of care are:      Cancer and Diabetes.     Medications impacting care: Cardiac and High blood pressure.  2) Examination of Body Systems comprised of:   Body structures and functions that impact the plan of care:      Upper  and Lower Extremities.   Activity limitations that impact the plan of care are:      Lifting, Squatting/kneeling, Stairs, Standing, Walking and reaching.  3) Clinical presentation characteristics are:   Stable/Uncomplicated.  4) Decision-Making    Low complexity using standardized patient assessment instrument and/or measureable assessment of functional outcome.  Cumulative Therapy Evaluation is: Low complexity.    Previous and current functional limitations:  (See Goal Flow Sheet for this information)    Short term and Long term goals: (See Goal Flow Sheet for this information)     Communication ability:  Patient appears to be able to clearly communicate and understand verbal and written communication and follow directions correctly.  Treatment Explanation - The following has been discussed with the patient:   RX ordered/plan of care  Anticipated outcomes  Possible risks and side effects  This patient would benefit from PT intervention to resume normal activities.   Rehab potential is good.    Frequency:  1 X week, once daily  Duration:  For 2 months on an every other week basis (1x/week x2 x/month x 2 months)  Discharge Plan:  Achieve all LTG.  Independent in home treatment program.  Reach maximal therapeutic benefit.    Please refer to the daily flowsheet for treatment today, total treatment time and time spent performing 1:1 timed codes.

## 2018-02-21 NOTE — MR AVS SNAPSHOT
"              After Visit Summary   2/21/2018    Darleen Ayala    MRN: [id number]           Patient Information     Date Of Birth          1955        Visit Information        Provider Department      2/21/2018 3:40 PM Natalie Sanford, PT [facility]        Today's Diagnoses     Generalized muscle weakness    -  1       Follow-ups after your visit        Your next 10 appointments already scheduled     Mar 09, 2018  1:30 PM CST   STU Extremity with Natalie Sanford PT   [facility] (STU [place]  )    [address]  [facility] [id number] [phone]              Who to contact     If you have questions or need follow up information about today's clinic visit or your schedule please contact [facility] directly at [phone].  Normal or non-critical lab and imaging results will be communicated to you by MyChart, letter or phone within 4 business days after the clinic has received the results. If you do not hear from us within 7 days, please contact the clinic through PICS Auditinghart or phone. If you have a critical or abnormal lab result, we will notify you by phone as soon as possible.  Submit refill requests through phorus or call your pharmacy and they will forward the refill request to us. Please allow 3 business days for your refill to be completed.          Additional Information About Your Visit        MyChart Information     phorus lets you send messages to your doctor, view your test results, renew your prescriptions, schedule appointments and more. To sign up, go to [email or web address] . Click on \"Log in\" on the left side of the screen, which will take you to the Welcome page. Then click on \"Sign up Now\" on the right side of the page.     You will be asked to enter the access code listed below, as well as some personal information. Please follow the directions to create your " username and password.     Your access code is: [email or web address]  Expires: 2018  9:42 AM     Your access code will  in 90 days. If you need help or a new code, please call your [place] clinic or [phone].        Care EveryWhere ID     This is your Care EveryWhere ID. This could be used by other organizations to access your [place] medical records  [id number]         Blood Pressure from Last 3 Encounters:   18 106/68   18 95/54   17 104/64    Weight from Last 3 Encounters:   18 75.8 kg (167 lb)   18 74.8 kg (165 lb)   17 73 kg (161 lb)              We Performed the Following     HC PT EVAL, LOW COMPLEXITY     STU INITIAL EVAL REPORT     NEUROMUSCULAR RE-EDUCATION     THERAPEUTIC EXERCISES        Primary Care Provider Office Phone # Fax #    Mariluz Smith [phone] [address] [facility] [address]        Equal Access to Services     [facility]: Hadii aad ku hadasho Soomaali, waaxda luqadaha, qaybta kaalmada adeegyasilas, bob darby . So [facility] [phone].    ATENCIÓN: Si habla español, tiene a duncan disposición servicios gratuitos de asistencia lingüística. [facility] [phone].    We comply with applicable federal civil rights laws and [place] laws. We do not discriminate on the basis of race, color, national origin, age, disability, sex, sexual orientation, or gender identity.            Thank you!     Thank you for choosing [facility]  for your care. Our goal is always to provide you with excellent care. Hearing back from our patients is one way we can continue to improve our services. Please take a few minutes to complete the written survey that you may receive in the mail after your visit with us. Thank you!             Your Updated Medication List - Protect others around you: Learn how to safely use, store and throw away your medicines at  www.disposemymeds.org.          This list is accurate as of 2/21/18  6:36 PM.  Always use your most recent med list.                   Brand Name Dispense Instructions for use Diagnosis    acyclovir 200 MG capsule    ZOVIRAX     Take 2 capsules by mouth        ASPIRIN NOT PRESCRIBED    INTENTIONAL    1 each    Please choose reason not prescribed, below    Type 2 diabetes mellitus with complication, with long-term current use of insulin (H)       blood glucose lancets standard    no brand specified    100 each    Use to test blood sugar 2-3 times daily or as directed.    Type 2 diabetes mellitus with complication, with long-term current use of insulin (H)       blood glucose monitoring meter device kit    no brand specified    1 kit    Use to test blood sugar 2-3 times daily or as directed.    Type 2 diabetes mellitus with complication, with long-term current use of insulin (H)       blood glucose monitoring test strip    no brand specified    100 strip    Use to test blood sugars 2-3 times daily or as directed    Type 2 diabetes mellitus with complication, with long-term current use of insulin (H)       eszopiclone 3 MG tablet    LUNESTA     Take 3 mg by mouth    Acute myeloid leukemia not having achieved remission (H)       glimepiride 4 MG tablet    AMARYL    90 tablet    Take 1 tablet (4 mg) by mouth every morning (before breakfast)    Acute myeloid leukemia not having achieved remission (H)       insulin pen needle 32G X 4 MM     100 each    Use  pen needles daily or as directed.    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)       metoprolol succinate 25 MG 24  hr tablet    TOPROL-XL     Take 12.5 mg by mouth    Acute myeloid leukemia not having achieved remission (H)       simvastatin 20 MG tablet    ZOCOR    90 tablet    Take 1 tablet (20 mg) by mouth At Bedtime    Acute myeloid leukemia not having achieved remission (H)       TOUJEO SOLOSTAR 300 UNIT/ML injection   Generic drug:  insulin glargine U-300     30 mL    Inject 85 Units Subcutaneous At Bedtime    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    Insomnia, unspecified type

## 2018-04-11 NOTE — MR AVS SNAPSHOT
"              After Visit Summary   4/11/2018    Darleen Ayala    MRN: [id number]           Patient Information     Date Of Birth          1955        Visit Information        Provider Department      4/11/2018 10:30 AM Coordinator,  Bmt Nurse;  2 116 CONSULT [facility] Marrow Trans[facility]        Today's Diagnoses     AML (acute myeloid leukemia) (H)    -  1          [facility] [place] ([facility]  [address] [place] [address]  Phone: [phone]  Clinic Hours:   Monday-Thursday:7am to 7pm   Friday: 7am to 5pm   Weekends and holidays:    8am to noon (in general)  If your fever is 100.5  or greater,   call the clinic.  After hours call the   hospital at [phone] or   [phone]. Ask for the BMT   fellow on-call            Follow-ups after your visit        Who to contact     If you have questions or need follow up information about today's clinic visit or your schedule please contact [facility] directly at [phone].  Normal or non-critical lab and imaging results will be communicated to you by Mobilingahart, letter or phone within 4 business days after the clinic has received the results. If you do not hear from us within 7 days, please contact the clinic through HealthWavet or phone. If you have a critical or abnormal lab result, we will notify you by phone as soon as possible.  Submit refill requests through Edtrips or call your pharmacy and they will forward the refill request to us. Please allow 3 business days for your refill to be completed.          Additional Information About Your Visit        Mobilingahart Information     Edtrips lets you send messages to your doctor, view your test results, renew your prescriptions, schedule appointments and more. To sign up, go to [email or web address] . Click on \"Log in\" on the left side of the screen, which will take you to the Welcome page. Then click on \"Sign up Now\" on the right side of the page. "     You will be asked to enter the access code listed below, as well as some personal information. Please follow the directions to create your username and password.     Your access code is: [email or web address]  Expires: 7/10/2018  2:32 PM     Your access code will  in 90 days. If you need help or a new code, please call your [place] clinic or [phone].        Care EveryWhere ID     This is your Care EveryWhere ID. This could be used by other organizations to access your [place] medical records  [id number]         Blood Pressure from Last 3 Encounters:   18 156/67   18 106/68   18 95/54    Weight from Last 3 Encounters:   18 75.3 kg (166 lb)   18 75.8 kg (167 lb)   18 74.8 kg (165 lb)              Today, you had the following     No orders found for display       Recent Review Flowsheet Data     BMT Recent Results Latest Ref Rng & Units 2018    WBC 4.0 - 11.0 10e9/L - 1.4(L)    Hemoglobin 11.7 - 15.7 g/dL - 8.5(L)    Platelet Count 150 - 450 10e9/L - 21(LL)    Neutrophils (Absolute) 1.6 - 8.3 10e9/L - 1.0(L)    Sodium 133 - 144 mmol/L 130(L) -    Potassium 3.4 - 5.3 mmol/L 4.5 -    Chloride 94 - 109 mmol/L 97 -    Glucose 70 - 99 mg/dL 230(H) -    Urea Nitrogen 7 - 30 mg/dL 10 -    Creatinine 0.52 - 1.04 mg/dL 0.71 -    Calcium (Total) 8.5 - 10.1 mg/dL 8.8 -    MCV 78 - 100 fl - 82               Primary Care Provider Office Phone # Fax #    Mariluz Smith [phone] [address] [facility] [address]        Equal Access to Services     [facility]: Rubina Mason, harrison diego, bob aliceaeg kharash la'aan ah. So [facility] [phone].    ATENCIÓN: Si habla español, tiene a duncan disposición servicios gratuitos de asistencia lingüística. Llame al [phone].    We comply with applicable federal civil rights laws and [place] laws. We do not discriminate on the basis of  race, color, national origin, age, disability, sex, sexual orientation, or gender identity.            Thank you!     Thank you for choosing [facility]  for your care. Our goal is always to provide you with excellent care. Hearing back from our patients is one way we can continue to improve our services. Please take a few minutes to complete the written survey that you may receive in the mail after your visit with us. Thank you!             Your Updated Medication List - Protect others around you: Learn how to safely use, store and throw away your medicines at www.disposemymeds.org.          This list is accurate as of 4/11/18 11:59 PM.  Always use your most recent med list.                   Brand Name Dispense Instructions for use Diagnosis    acyclovir 200 MG capsule    ZOVIRAX     Take 2 capsules by mouth        ASPIRIN NOT PRESCRIBED    INTENTIONAL    1 each    Please choose reason not prescribed, below    Type 2 diabetes mellitus with complication, with long-term current use of insulin (H)       blood glucose lancets standard    no brand specified    100 each    Use to test blood sugar 2-3 times daily or as directed.    Type 2 diabetes mellitus with complication, with long-term current use of insulin (H)       blood glucose monitoring meter device kit    no brand specified    1 kit    Use to test blood sugar 2-3 times daily or as directed.    Type 2 diabetes mellitus with complication, with long-term current use of insulin (H)       blood glucose monitoring test strip    no brand specified    100 strip    Use to test blood sugars 2-3 times daily or as directed    Type 2 diabetes mellitus with complication, with long-term current use of insulin (H)       eszopiclone 3 MG tablet    LUNESTA     Take 3 mg by mouth    Acute myeloid leukemia not having achieved remission (H)       glimepiride 4 MG tablet    AMARYL    90 tablet    Take 1 tablet (4 mg) by mouth every morning (before  breakfast)    Acute myeloid leukemia not having achieved remission (H)       insulin pen needle 32G X 4 MM     100 each    Use  pen needles daily or as directed.    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)       metoprolol succinate 25 MG 24 hr tablet    TOPROL-XL     Take 12.5 mg by mouth    Acute myeloid leukemia not having achieved remission (H)       simvastatin 20 MG tablet    ZOCOR    90 tablet    Take 1 tablet (20 mg) by mouth At Bedtime    Acute myeloid leukemia not having achieved remission (H)       TOUJEO SOLOSTAR 300 UNIT/ML injection   Generic drug:  insulin glargine U-300     30 mL    Inject 85 Units Subcutaneous At Bedtime    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    Insomnia, unspecified type

## 2018-04-11 NOTE — MR AVS SNAPSHOT
"              After Visit Summary   4/11/2018    Darleen Ayala    MRN: [id number]           Patient Information     Date Of Birth          1955        Visit Information        Provider Department      4/11/2018 9:00 AM Malvin Ruff MD [facility] Blood and Marrow Transplant        Today's Diagnoses     Acute myeloid leukemia not having achieved remission (H)    -  1          [facility] ([facility])  [address] [place] [address]  Phone: [phone]  Clinic Hours:   Monday-Thursday:7am to 7pm   Friday: 7am to 5pm   Weekends and holidays:    8am to noon (in general)  If your fever is 100.5  or greater,   call the clinic.  After hours call the   hospital at [phone] or   [phone]. Ask for the BMT   fellow on-call           Care Instructions    No check out notes/pt left    Jyoti  4/11/18  1237pm          Follow-ups after your visit        Who to contact     If you have questions or need follow up information about today's clinic visit or your schedule please contact [facility] BLOOD AND MARROW TRANSPLANT directly at [phone].  Normal or non-critical lab and imaging results will be communicated to you by MocoSpacehart, letter or phone within 4 business days after the clinic has received the results. If you do not hear from us within 7 days, please contact the clinic through MocoSpacehart or phone. If you have a critical or abnormal lab result, we will notify you by phone as soon as possible.  Submit refill requests through ApplyMap or call your pharmacy and they will forward the refill request to us. Please allow 3 business days for your refill to be completed.          Additional Information About Your Visit        MyChart Information     ApplyMap lets you send messages to your doctor, view your test results, renew your prescriptions, schedule appointments and more. To sign up, go to [email or web address] . Click on \"Log in\" on the left side of the screen, which will take you " "to the Welcome page. Then click on \"Sign up Now\" on the right side of the page.     You will be asked to enter the access code listed below, as well as some personal information. Please follow the directions to create your username and password.     Your access code is: [email or web address]  Expires: 7/10/2018  2:32 PM     Your access code will  in 90 days. If you need help or a new code, please call your [place] clinic or [phone].        Care EveryWhere ID     This is your Care EveryWhere ID. This could be used by other organizations to access your [facility] medical records  [id number]        Your Vitals Were     Pulse Temperature Respirations Height Pulse Oximetry BMI (Body Mass Index)    42 97.3  F (36.3  C) (Oral) 16 1.67 m (5' 5.75\") 100% 27 kg/m2       Blood Pressure from Last 3 Encounters:   18 156/67   18 106/68   18 95/54    Weight from Last 3 Encounters:   18 75.3 kg (166 lb)   18 75.8 kg (167 lb)   18 74.8 kg (165 lb)              We Performed the Following     CBC with platelets differential     PRA BMT        Recent Review Flowsheet Data     BMT Recent Results Latest Ref Rng & Units 2018    WBC 4.0 - 11.0 10e9/L - 1.4(L)    Hemoglobin 11.7 - 15.7 g/dL - 8.5(L)    Platelet Count 150 - 450 10e9/L - 21(LL)    Neutrophils (Absolute) 1.6 - 8.3 10e9/L - 1.0(L)    Sodium 133 - 144 mmol/L 130(L) -    Potassium 3.4 - 5.3 mmol/L 4.5 -    Chloride 94 - 109 mmol/L 97 -    Glucose 70 - 99 mg/dL 230(H) -    Urea Nitrogen 7 - 30 mg/dL 10 -    Creatinine 0.52 - 1.04 mg/dL 0.71 -    Calcium (Total) 8.5 - 10.1 mg/dL 8.8 -    MCV 78 - 100 fl - 82               Primary Care Provider Office Phone # Fax #    Mariluz Smith [phone]       [phone] [facility] [address]        Equal Access to Services     REYES KAT AH: Rubina Mason, harrison diego, bob alicea" ah. So [facility] [phone].    ATENCIÓN: Si bethany love, tiene a duncan disposición servicios gratuitos de asistencia lingüística. Panfilo al [phone].    We comply with applicable federal civil rights laws and [place] laws. We do not discriminate on the basis of race, color, national origin, age, disability, sex, sexual orientation, or gender identity.            Thank you!     Thank you for choosing [facility] BLOOD AND MARROW TRANSPLANT  for your care. Our goal is always to provide you with excellent care. Hearing back from our patients is one way we can continue to improve our services. Please take a few minutes to complete the written survey that you may receive in the mail after your visit with us. Thank you!             Your Updated Medication List - Protect others around you: Learn how to safely use, store and throw away your medicines at www.disposemymeds.org.          This list is accurate as of 4/11/18  2:32 PM.  Always use your most recent med list.                   Brand Name Dispense Instructions for use Diagnosis    acyclovir 200 MG capsule    ZOVIRAX     Take 2 capsules by mouth        ASPIRIN NOT PRESCRIBED    INTENTIONAL    1 each    Please choose reason not prescribed, below    Type 2 diabetes mellitus with complication, with long-term current use of insulin (H)       blood glucose lancets standard    no brand specified    100 each    Use to test blood sugar 2-3 times daily or as directed.    Type 2 diabetes mellitus with complication, with long-term current use of insulin (H)       blood glucose monitoring meter device kit    no brand specified    1 kit    Use to test blood sugar 2-3 times daily or as directed.    Type 2 diabetes mellitus with complication, with long-term current use of insulin (H)       blood glucose monitoring test strip    no brand specified    100 strip    Use to test blood sugars 2-3 times daily or as directed    Type 2 diabetes mellitus with complication, with long-term  current use of insulin (H)       eszopiclone 3 MG tablet    LUNESTA     Take 3 mg by mouth    Acute myeloid leukemia not having achieved remission (H)       glimepiride 4 MG tablet    AMARYL    90 tablet    Take 1 tablet (4 mg) by mouth every morning (before breakfast)    Acute myeloid leukemia not having achieved remission (H)       insulin pen needle 32G X 4 MM     100 each    Use  pen needles daily or as directed.    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)       metoprolol succinate 25 MG 24 hr tablet    TOPROL-XL     Take 12.5 mg by mouth    Acute myeloid leukemia not having achieved remission (H)       simvastatin 20 MG tablet    ZOCOR    90 tablet    Take 1 tablet (20 mg) by mouth At Bedtime    Acute myeloid leukemia not having achieved remission (H)       TOUJEO SOLOSTAR 300 UNIT/ML injection   Generic drug:  insulin glargine U-300     30 mL    Inject 85 Units Subcutaneous At Bedtime    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    Insomnia, unspecified type

## 2018-04-11 NOTE — MR AVS SNAPSHOT
"              After Visit Summary   4/11/2018    Darleen Ayala    MRN: [id number]           Patient Information     Date Of Birth          1955        Visit Information        Provider Department      4/11/2018 11:00 AM Maria Del Carmen Doss LICSW;  2 [address] CONSULT [facility] Blood and Marrow Transplant        Today's Diagnoses     Visit for counseling    -  1          [facility] ([facility])  [address] [place] [address]  Phone: [phone]  Clinic Hours:   Monday-Thursday:7am to 7pm   Friday: 7am to 5pm   Weekends and holidays:    8am to noon (in general)  If your fever is 100.5  or greater,   call the clinic.  After hours call the   hospital at [phone] or   [phone]. Ask for the BMT   fellow on-call            Follow-ups after your visit        Who to contact     If you have questions or need follow up information about today's clinic visit or your schedule please contact [facility] BLOOD AND MARROW TRANSPLANT directly at [phone].  Normal or non-critical lab and imaging results will be communicated to you by apomiohart, letter or phone within 4 business days after the clinic has received the results. If you do not hear from us within 7 days, please contact the clinic through WisdomTreet or phone. If you have a critical or abnormal lab result, we will notify you by phone as soon as possible.  Submit refill requests through Ovo Cosmico or call your pharmacy and they will forward the refill request to us. Please allow 3 business days for your refill to be completed.          Additional Information About Your Visit        MyChart Information     Ovo Cosmico lets you send messages to your doctor, view your test results, renew your prescriptions, schedule appointments and more. To sign up, go to [email or web address] . Click on \"Log in\" on the left side of the screen, which will take you to the Welcome page. Then click on \"Sign up Now\" on the right side of the page.     You will be asked " to enter the access code listed below, as well as some personal information. Please follow the directions to create your username and password.     Your access code is: [email or web address]  Expires: 7/10/2018  2:32 PM     Your access code will  in 90 days. If you need help or a new code, please call your [place] clinic or [phone].        Care EveryWhere ID     This is your Care EveryWhere ID. This could be used by other organizations to access your [place] medical records  [id number]         Blood Pressure from Last 3 Encounters:   18 156/67   18 106/68   18 95/54    Weight from Last 3 Encounters:   18 75.3 kg (166 lb)   18 75.8 kg (167 lb)   18 74.8 kg (165 lb)              Today, you had the following     No orders found for display       Recent Review Flowsheet Data     BMT Recent Results Latest Ref Rng & Units 2018    WBC 4.0 - 11.0 10e9/L - 1.4(L)    Hemoglobin 11.7 - 15.7 g/dL - 8.5(L)    Platelet Count 150 - 450 10e9/L - 21(LL)    Neutrophils (Absolute) 1.6 - 8.3 10e9/L - 1.0(L)    Sodium 133 - 144 mmol/L 130(L) -    Potassium 3.4 - 5.3 mmol/L 4.5 -    Chloride 94 - 109 mmol/L 97 -    Glucose 70 - 99 mg/dL 230(H) -    Urea Nitrogen 7 - 30 mg/dL 10 -    Creatinine 0.52 - 1.04 mg/dL 0.71 -    Calcium (Total) 8.5 - 10.1 mg/dL 8.8 -    MCV 78 - 100 fl - 82               Primary Care Provider Office Phone # Fax #    Mariluz Smith [phone] [address] [facility] [address]        Equal Access to Services     REYES KAT AH: Rubina Mason, harrison diego, bob alicea la'aan ah. So [facility] [phone].    ATENCIÓN: Si habla español, tiene a duncan disposición servicios gratuitos de asistencia lingüística. Llame al [phone].    We comply with applicable federal civil rights laws and [place] laws. We do not discriminate on the basis of race, color, national  origin, age, disability, sex, sexual orientation, or gender identity.            Thank you!     Thank you for choosing [facility] BLOOD AND MARROW TRANSPLANT  for your care. Our goal is always to provide you with excellent care. Hearing back from our patients is one way we can continue to improve our services. Please take a few minutes to complete the written survey that you may receive in the mail after your visit with us. Thank you!             Your Updated Medication List - Protect others around you: Learn how to safely use, store and throw away your medicines at www.disposemymeds.org.          This list is accurate as of 4/11/18 11:59 PM.  Always use your most recent med list.                   Brand Name Dispense Instructions for use Diagnosis    acyclovir 200 MG capsule    ZOVIRAX     Take 2 capsules by mouth        ASPIRIN NOT PRESCRIBED    INTENTIONAL    1 each    Please choose reason not prescribed, below    Type 2 diabetes mellitus with complication, with long-term current use of insulin (H)       blood glucose lancets standard    no brand specified    100 each    Use to test blood sugar 2-3 times daily or as directed.    Type 2 diabetes mellitus with complication, with long-term current use of insulin (H)       blood glucose monitoring meter device kit    no brand specified    1 kit    Use to test blood sugar 2-3 times daily or as directed.    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)       metoprolol succinate 25 MG 24 hr tablet    TOPROL-XL     Take 12.5 mg by mouth    Acute myeloid leukemia not having achieved remission (H)       simvastatin 20 MG tablet    ZOCOR    90 tablet    Take 1 tablet (20 mg) by mouth At Bedtime    Acute myeloid leukemia not having achieved remission (H)       TOUJEO SOLOSTAR 300 UNIT/ML injection   Generic drug:  insulin glargine U-300     30 mL    Inject 85 Units Subcutaneous At Bedtime    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    Insomnia, unspecified type

## 2018-04-11 NOTE — PROGRESS NOTES
I saw and examined Ms. Ayala  today.  She was accompanied by her boyfriend and then her daughter.    Please refer to my previous note On September 27, 2017 for details on the original diagnosis and management of this patient.  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  She was then referred to discuss transportation as part of her treatment options.  At that time after review on cytogenetics and molecular findings we found that her probability of disease-free and overall survival were similar with consolidation chemotherapy or allogenic transplant.  The patient returned to her primary oncologist and had consolidation with intermediate dose cytarabine.  She finished those treatments in December and had a bone marrow biopsy January showing a continued complete remission.  Unfortunately in March she had abnormal blood counts and was readmitted with recurrent acute leukemia.  Her bone marrow biopsy on March 1 showed 57% blasts.  The marrow was finalized on 3/5/2018 showing a 80% cellular marrow with 92% blasts.  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  She was admitted to [facility] on 3/6/2018 and was treated with reinduction chemotherapy with GC LAC.  She tolerated the treatment reasonably well and was recently discharged and has been followed in the outpatient setting with transfusion support.    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    Her remaining complete review of system is negative except for what is mentioned above.  She has not found any new drug allergies other than levofloxacin Hydro and oxycodone,  "shellfish products, tetanus toxoid's, and bisacodyl.    The patient's private previous medical history significant for what is described above and in addition to cataract surgery, and tonsillectomy.    On exam today the patient was alert oriented any nonacute distress she had complete alopecia.  Her vital signs were /67 (BP Location: Left arm, Patient Position: Chair, Cuff Size: Adult Regular)  Pulse (!) 42  Temp 97.3  F (36.3  C) (Oral)  Resp 16  Ht 1.67 m (5' 5.75\")  Wt 75.3 kg (166 lb)  SpO2 100%  BMI 27 kg/m2.  Her lungs were clear bilateral with no crackles or wheezes.  Her heart had regular rhythm and rate.  She seemed to have frequent extra beats.  She had a systolic ejection murmur.  Her abdomen was flat and nontender.  I do not feel an enlarged spleen or an enlarged liver.  There was no palpable mass.  Bowel sounds are present and normal.  Extremities were warm and well-perfused with no edema.  The patient was grossly neurologically intact.  There were no skin rashes.  Her buccal mucosa was moist with no ulcers or erosions.  There is no septic teeth but there were some missing teeth.  A couple of her teeth had cracks on them.    Assessment and recommendation    This is a 62-year-old female with AML relapse after primary therapy.  She has not yet had a second complete remission documented.  Today I discussed with the patient the pros and cons of allogenic transplant in second complete remission, if she were to achieve one.    We went back and reviewed the decision the patient made back in the summer regarding consolidation chemo.  At that time she had the same probability of disease-free survival but with an estimated treatment-related mortality with allogenic transplant of about 40%.  This was due to her significant multiple comorbidities.  At that time was elected to proceed with chemotherapy.  The patient was not interested in transplant.    At the time of relapse the probability of " persistent remission with chemotherapy alone is certainly less than 10%, more likely close to 0.  Allogenic transplant is the only potentially curative therapy for relapsed AML that is able to achieve a second complete remission.  The patient's risk of complications remains high.  Her comorbidity score is no less then when I first saw her last summer.  However, now chemotherapy alone is unlikely to provide long-term survival in remission.  In fact, if she develops refractory leukemia her survival is expected to be very short.  While transplant may have a high risk it is the only potentially curative therapy.    The patient and her boyfriend and daughter had multiple questions regarding outcomes and complications that I answered to the best of my ability to their apparent satisfaction.  I suggested to the patient that she wants to at least be considered for transplant that we should withdraw anti-HLA antibodies today.  I will look into whether or not we can consider her daughter as a potential donor.  The daughter suspects that she has some kind of ultimate disease, although it was never formally diagnosed.  We would hope that this patient will have at least cord blood units that would be suitable to use for a transplant.  While she may have unrelated adult donors available, the time of 3-4 months to mobilize a donor it is too long for this patient's clinical condition.    The patient showed good understanding and is willing to at least come for workup.  At this time the plan is as follows:    1.  The patient will continue to be followed at [facility] and she will need a bone marrow biopsy in the next week or so to determine whether or not she has residual leukemia versus remission.    2.  If in remission the patient should be referred back to us for workup procedures.  She and her family understand that only after we have complete evaluation of her organ function and disease status will determine  whether or not she is eligible for transplant.    3.  In the meantime will work on donor selection and preparation for this patient.  Will assess whether the daughter is still potential donor.  And will evaluate potential clinical trials and local studies she may qualify for, as the haploidentical donor versus cord study.    Thank you very much for referring the patient to us and allowing us to participate in her care.  Please feel free to contact me with any additional questions at [phone].

## 2018-04-11 NOTE — LETTER
4/11/2018      [facility]: [facility]  [id number] [facility] [address]       I saw and examined Ms. Ayala  today.  She was accompanied by her boyfriend and then her daughter.    Please refer to my previous note On September 27, 2017 for details on the original diagnosis and management of this patient.  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  She was then referred to discuss transportation as part of her treatment options.  At that time after review on cytogenetics and molecular findings we found that her probability of disease-free and overall survival were similar with consolidation chemotherapy or allogenic transplant.  The patient returned to her primary oncologist and had consolidation with intermediate dose cytarabine.  She finished those treatments in December and had a bone marrow biopsy January showing a continued complete remission.  Unfortunately in March she had abnormal blood counts and was readmitted with recurrent acute leukemia.  Her bone marrow biopsy on March 1 showed 57% blasts.  The marrow was finalized on 3/5/2018 showing a 80% cellular marrow with 92% blasts.  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  She was admitted to [facility] on 3/6/2018 and was treated with reinduction chemotherapy with GC LAC.  She tolerated the treatment reasonably well and was recently discharged and has been followed in the outpatient setting with transfusion support.    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    Her remaining complete review of system is negative except for what is mentioned  "above.  She has not found any new drug allergies other than levofloxacin Hydro and oxycodone, shellfish products, tetanus toxoid's, and bisacodyl.    The patient's private previous medical history significant for what is described above and in addition to cataract surgery, and tonsillectomy.    On exam today the patient was alert oriented any nonacute distress she had complete alopecia.  Her vital signs were /67 (BP Location: Left arm, Patient Position: Chair, Cuff Size: Adult Regular)  Pulse (!) 42  Temp 97.3  F (36.3  C) (Oral)  Resp 16  Ht 1.67 m (5' 5.75\")  Wt 75.3 kg (166 lb)  SpO2 100%  BMI 27 kg/m2.  Her lungs were clear bilateral with no crackles or wheezes.  Her heart had regular rhythm and rate.  She seemed to have frequent extra beats.  She had a systolic ejection murmur.  Her abdomen was flat and nontender.  I do not feel an enlarged spleen or an enlarged liver.  There was no palpable mass.  Bowel sounds are present and normal.  Extremities were warm and well-perfused with no edema.  The patient was grossly neurologically intact.  There were no skin rashes.  Her buccal mucosa was moist with no ulcers or erosions.  There is no septic teeth but there were some missing teeth.  A couple of her teeth had cracks on them.    Assessment and recommendation    This is a 62-year-old female with AML relapse after primary therapy.  She has not yet had a second complete remission documented.  Today I discussed with the patient the pros and cons of allogenic transplant in second complete remission, if she were to achieve one.    We went back and reviewed the decision the patient made back in the summer regarding consolidation chemo.  At that time she had the same probability of disease-free survival but with an estimated treatment-related mortality with allogenic transplant of about 40%.  This was due to her significant multiple comorbidities.  At that time was elected to proceed with chemotherapy.  The " patient was not interested in transplant.    At the time of relapse the probability of persistent remission with chemotherapy alone is certainly less than 10%, more likely close to 0.  Allogenic transplant is the only potentially curative therapy for relapsed AML that is able to achieve a second complete remission.  The patient's risk of complications remains high.  Her comorbidity score is no less then when I first saw her last summer.  However, now chemotherapy alone is unlikely to provide long-term survival in remission.  In fact, if she develops refractory leukemia her survival is expected to be very short.  While transplant may have a high risk it is the only potentially curative therapy.    The patient and her boyfriend and daughter had multiple questions regarding outcomes and complications that I answered to the best of my ability to their apparent satisfaction.  I suggested to the patient that she wants to at least be considered for transplant that we should withdraw anti-HLA antibodies today.  I will look into whether or not we can consider her daughter as a potential donor.  The daughter suspects that she has some kind of ultimate disease, although it was never formally diagnosed.  We would hope that this patient will have at least cord blood units that would be suitable to use for a transplant.  While she may have unrelated adult donors available, the time of 3-4 months to mobilize a donor it is too long for this patient's clinical condition.    The patient showed good understanding and is willing to at least come for workup.  At this time the plan is as follows:    1.  The patient will continue to be followed at [facility] and she will need a bone marrow biopsy in the next week or so to determine whether or not she has residual leukemia versus remission.    2.  If in remission the patient should be referred back to us for workup procedures.  She and her family understand that only after we  have complete evaluation of her organ function and disease status will determine whether or not she is eligible for transplant.    3.  In the meantime will work on donor selection and preparation for this patient.  Will assess whether the daughter is still potential donor.  And will evaluate potential clinical trials and local studies she may qualify for, as the haploidentical donor versus cord study.    Thank you very much for referring the patient to us and allowing us to participate in her care.  Please feel free to contact me with any additional questions at [phone].      Malvin Ruff MD

## 2018-04-11 NOTE — NURSING NOTE
"Oncology Rooming Note    April 11, 2018 9:09 AM   Darleen Ayala is a 63 year old female who presents for:    Chief Complaint   Patient presents with     RECHECK     AML (acute myeloid leukemia)      Initial Vitals: /67 (BP Location: Left arm, Patient Position: Chair, Cuff Size: Adult Regular)  Pulse (!) 42  Temp 97.3  F (36.3  C) (Oral)  Resp 16  Ht 1.67 m (5' 5.75\")  Wt 75.3 kg (166 lb)  SpO2 100%  BMI 27 kg/m2 Estimated body mass index is 27 kg/(m^2) as calculated from the following:    Height as of this encounter: 1.67 m (5' 5.75\").    Weight as of this encounter: 75.3 kg (166 lb). Body surface area is 1.87 meters squared.  Moderate Pain (4) Comment: lower back    No LMP recorded. Patient is postmenopausal.  Allergies reviewed: Yes  Medications reviewed: Yes    Medications: Medication refills not needed today.  Pharmacy name entered into [employer]:    [id number] [place], [place] - [address] AT [address] OF [facility] [address] - [place], [place] - [address] [facility] [address]    Clinical concerns:  No new concerns  Provider was notified.    7 minutes for nursing intake (face to face time)     Jazzmine Ji MA              "

## 2018-04-17 NOTE — PROGRESS NOTES
"Blood and Marrow Transplant   Repeat New Transplant Visit with   Clinical     Darleen ALFRED Jamie  2018    With whom do you live:   Clyde \"Robert\" - significant other    Relocation Requirement:   Darleen lives 27 minutes / 22.9 miles from [facility] and will not be required to relocate post-transplant.     Diagnosis:   Acute Myelogenous Leukemia    Transplant Type:   Allogeneic    Family Information  Next of Kin:   Yandy Ayala    Phone Number:   [phone]    Parents:       Siblings:   Sister (80; lives in [place]; 1/2 sister)    Children:   Yandy Ayala     Grandchildren:  8 total - all are from [employer] children    Employment  Darleen works as a  for [employer]. She has not been back to work since she was diagnosed.     Source of Income:   Her income is through Social Security as a .    Spouse/Partner Employed:  Robert works as a  professional    Do you have concerns or questions about finances or insurance related to BMT?:   Darleen has [place] [employer] insurance that she pays a premium of $44/month for.     Have you completed a health care directive?:   Yes - on file in [employer]    Support:  Is there a network of people who are available to support you and/or your family?:   Yes    Education Provided:   Caregiver Requirement/Role  BMT Packet provided  BMT Book provided  HCD information and blank document  [place]  Support resources  Description of Inpatient Unit    Comments:   Darleen comes with her daughter Yandy and SO Robert for her repeat NT consultation. Darleen endorsed that her daughter Yandy will be her primary caregiver and her nephew may be able to help as well. Robert will need to continue to work and will be able to provide caregiving assistance when he is home. Darleen shared that she appreciated hearing the information about transplant again as she did not recall the information shared last time. Encouraged Darleen to call with " questions or concerns as they arise.     Maria Del Carmen Doss  [phone] - Pager  4/17/2018

## 2018-04-20 NOTE — PROGRESS NOTES
Met with patient and family after new evaluation with Dr Ruff regarding plan and BMT nurse coordinator role. Provided patient with Rudy Resendiz's and Shiela Monsivais' contact information for future questions and plan. PRA drawn. Patient verbalized understanding of provided information.

## 2018-12-14 ENCOUNTER — TELEPHONE (OUTPATIENT)
Dept: ONCOLOGY | Facility: CLINIC | Age: 63
End: 2018-12-14

## 2018-12-14 NOTE — TELEPHONE ENCOUNTER
Tobacco Treatment Team at the [facility] attempted to reach Ms. Ayala on 12/14/2018 regarding the tobacco cessation program to help Ms. Ayala to quit smoking. We will attempt to reach Ms. Ayala another time.

## 2019-02-01 ENCOUNTER — TELEPHONE (OUTPATIENT)
Dept: INTERNAL MEDICINE | Facility: CLINIC | Age: 64
End: 2019-02-01

## 2019-02-01 NOTE — TELEPHONE ENCOUNTER
"  Panel Management Review            Composite cancer screening  Chart review shows that this patient is due/due soon for the following Pap Smear, Mammogram and Colonoscopy  Summary:    Patient is due/failing the following:   COLONOSCOPY, MAMMOGRAM and PAP    Action needed:   COLONOSCOPY, MAMMOGRAM and PAP    Type of outreach:    Patient  per [facility] note  \"She will be discharged on 18 to she passed away in hospital\"..             Questions for provider review:    None                                                                                                                                    Katherine Pelayo, VINOD       Chart routed to close .          "

## 2025-05-21 NOTE — TELEPHONE ENCOUNTER
printed and placed on provider desk face down.      Please see below.    Thank you, MADONNA AlmazanN RN   
As this is a controlled substance and because the patient appears to have been prescribed ativan from her Oncology office, on 1.13.18, we'll need to make sure that the medication combination that the patient is on is safe.     I'd originally advised a clinic appointment in 3 weeks from today (I do not see this being scheduled yet).    Please advise for the patient to schedule a clinic visit soon, to discuss her insomnia.      Also:  Please run a  on this patient for the past year, and place face down on my desk.  Thank you,  Mariluz Smith MD     
I called and left the patient a VM.  Clinic number given.  Erin Mcgraw,     
I called the patient and scheduled an appointment for her to see Dr. Smith on 1/22/18.  Erin Mcgraw,     
Med pending, listed as historical.   To provider to advise on refill    Thank you, MADONNA AlmazanN RN   
Patient has not been using the med Eszopiclone 3 mg. Recently as she has not had the money and Robbi won't pay for it. She has the money to get it right now and would like it called in for a 90 day supply. Please call to notify. It would be great for today as she has a ride. Ok to leave a message.    
Discharged